# Patient Record
Sex: FEMALE | Race: WHITE | NOT HISPANIC OR LATINO | Employment: FULL TIME | URBAN - METROPOLITAN AREA
[De-identification: names, ages, dates, MRNs, and addresses within clinical notes are randomized per-mention and may not be internally consistent; named-entity substitution may affect disease eponyms.]

---

## 2021-05-06 ENCOUNTER — PATIENT MESSAGE (OUTPATIENT)
Dept: RESEARCH | Facility: HOSPITAL | Age: 51
End: 2021-05-06

## 2021-05-12 DIAGNOSIS — Z12.31 ENCOUNTER FOR SCREENING MAMMOGRAM FOR MALIGNANT NEOPLASM OF BREAST: Primary | ICD-10-CM

## 2021-06-27 ENCOUNTER — HOSPITAL ENCOUNTER (EMERGENCY)
Facility: HOSPITAL | Age: 51
Discharge: HOME | End: 2021-06-28
Attending: FAMILY MEDICINE
Payer: MEDICAID

## 2021-06-27 VITALS
HEART RATE: 95 BPM | RESPIRATION RATE: 18 BRPM | BODY MASS INDEX: 23.57 KG/M2 | TEMPERATURE: 98 F | SYSTOLIC BLOOD PRESSURE: 122 MMHG | HEIGHT: 63 IN | WEIGHT: 133 LBS | DIASTOLIC BLOOD PRESSURE: 57 MMHG | OXYGEN SATURATION: 98 %

## 2021-06-27 DIAGNOSIS — N30.90 CYSTITIS: Primary | ICD-10-CM

## 2021-06-27 LAB
B-HCG UR QL: NEGATIVE
BACTERIA #/AREA URNS HPF: ABNORMAL /HPF
BILIRUB UR QL STRIP: NEGATIVE
CLARITY UR: CLEAR
COLOR UR: YELLOW
GLUCOSE UR QL STRIP: NEGATIVE
HGB UR QL STRIP: ABNORMAL
HYALINE CASTS #/AREA URNS LPF: 0 /LPF
KETONES UR QL STRIP: NEGATIVE
LEUKOCYTE ESTERASE UR QL STRIP: ABNORMAL
MICROSCOPIC COMMENT: ABNORMAL
NITRITE UR QL STRIP: NEGATIVE
PH UR STRIP: 7 [PH] (ref 5–8)
PROT UR QL STRIP: NEGATIVE
RBC #/AREA URNS HPF: 3 /HPF (ref 0–4)
SP GR UR STRIP: <=1.005 (ref 1–1.03)
SQUAMOUS #/AREA URNS HPF: 0 /HPF
URN SPEC COLLECT METH UR: ABNORMAL
UROBILINOGEN UR STRIP-ACNC: NEGATIVE EU/DL
WBC #/AREA URNS HPF: 37 /HPF (ref 0–5)

## 2021-06-27 PROCEDURE — 81025 URINE PREGNANCY TEST: CPT | Performed by: FAMILY MEDICINE

## 2021-06-27 PROCEDURE — 25000003 PHARM REV CODE 250: Performed by: FAMILY MEDICINE

## 2021-06-27 PROCEDURE — 99284 EMERGENCY DEPT VISIT MOD MDM: CPT | Mod: 25

## 2021-06-27 PROCEDURE — 87088 URINE BACTERIA CULTURE: CPT | Performed by: FAMILY MEDICINE

## 2021-06-27 PROCEDURE — 87077 CULTURE AEROBIC IDENTIFY: CPT | Performed by: FAMILY MEDICINE

## 2021-06-27 PROCEDURE — 87186 SC STD MICRODIL/AGAR DIL: CPT | Performed by: FAMILY MEDICINE

## 2021-06-27 PROCEDURE — 81000 URINALYSIS NONAUTO W/SCOPE: CPT | Performed by: FAMILY MEDICINE

## 2021-06-27 PROCEDURE — 87086 URINE CULTURE/COLONY COUNT: CPT | Performed by: FAMILY MEDICINE

## 2021-06-27 RX ORDER — KETOROLAC TROMETHAMINE 10 MG/1
10 TABLET, FILM COATED ORAL
Status: COMPLETED | OUTPATIENT
Start: 2021-06-27 | End: 2021-06-27

## 2021-06-27 RX ORDER — OXYBUTYNIN CHLORIDE 5 MG/1
5 TABLET ORAL 3 TIMES DAILY
COMMUNITY
End: 2023-03-07 | Stop reason: ALTCHOICE

## 2021-06-27 RX ADMIN — KETOROLAC TROMETHAMINE 10 MG: 10 TABLET, FILM COATED ORAL at 10:06

## 2021-06-28 PROCEDURE — 25000003 PHARM REV CODE 250: Performed by: FAMILY MEDICINE

## 2021-06-28 RX ORDER — CEPHALEXIN 500 MG/1
500 CAPSULE ORAL 4 TIMES DAILY
Qty: 20 CAPSULE | Refills: 0 | Status: SHIPPED | OUTPATIENT
Start: 2021-06-28 | End: 2021-07-03

## 2021-06-28 RX ORDER — CEPHALEXIN 250 MG/1
500 CAPSULE ORAL
Status: COMPLETED | OUTPATIENT
Start: 2021-06-28 | End: 2021-06-28

## 2021-06-28 RX ADMIN — CEPHALEXIN 500 MG: 250 CAPSULE ORAL at 01:06

## 2021-06-30 LAB — BACTERIA UR CULT: ABNORMAL

## 2021-08-13 ENCOUNTER — HOSPITAL ENCOUNTER (OUTPATIENT)
Dept: RADIOLOGY | Facility: HOSPITAL | Age: 51
Discharge: HOME | End: 2021-08-13
Attending: INTERNAL MEDICINE
Payer: MEDICAID

## 2021-08-13 DIAGNOSIS — J18.9 PNEUMONIA: Primary | ICD-10-CM

## 2021-08-13 DIAGNOSIS — J18.9 PNEUMONIA: ICD-10-CM

## 2021-08-13 PROCEDURE — 71046 X-RAY EXAM CHEST 2 VIEWS: CPT | Mod: TC

## 2022-05-07 ENCOUNTER — HOSPITAL ENCOUNTER (EMERGENCY)
Facility: HOSPITAL | Age: 52
Discharge: HOME | End: 2022-05-07
Attending: STUDENT IN AN ORGANIZED HEALTH CARE EDUCATION/TRAINING PROGRAM
Payer: MEDICAID

## 2022-05-07 VITALS
HEART RATE: 88 BPM | WEIGHT: 130 LBS | RESPIRATION RATE: 16 BRPM | TEMPERATURE: 97 F | SYSTOLIC BLOOD PRESSURE: 121 MMHG | HEIGHT: 63 IN | BODY MASS INDEX: 23.04 KG/M2 | OXYGEN SATURATION: 100 % | DIASTOLIC BLOOD PRESSURE: 56 MMHG

## 2022-05-07 DIAGNOSIS — M79.18 MUSCULOSKELETAL PAIN: Primary | ICD-10-CM

## 2022-05-07 LAB
BACTERIA #/AREA URNS HPF: NEGATIVE /HPF
BILIRUB UR QL STRIP: NEGATIVE
CLARITY UR: CLEAR
COLOR UR: YELLOW
GLUCOSE UR QL STRIP: NEGATIVE
HGB UR QL STRIP: ABNORMAL
HYALINE CASTS #/AREA URNS LPF: 10.6 /LPF
KETONES UR QL STRIP: ABNORMAL
LEUKOCYTE ESTERASE UR QL STRIP: ABNORMAL
MICROSCOPIC COMMENT: ABNORMAL
NITRITE UR QL STRIP: NEGATIVE
PH UR STRIP: 7 [PH] (ref 5–8)
PROT UR QL STRIP: NEGATIVE
RBC #/AREA URNS HPF: 24 /HPF (ref 0–4)
SP GR UR STRIP: >=1.03 (ref 1–1.03)
SQUAMOUS #/AREA URNS HPF: 6 /HPF
URN SPEC COLLECT METH UR: ABNORMAL
UROBILINOGEN UR STRIP-ACNC: 1 EU/DL
WBC #/AREA URNS HPF: 5 /HPF (ref 0–5)

## 2022-05-07 PROCEDURE — 99284 EMERGENCY DEPT VISIT MOD MDM: CPT | Mod: 25

## 2022-05-07 PROCEDURE — 96372 THER/PROPH/DIAG INJ SC/IM: CPT | Performed by: STUDENT IN AN ORGANIZED HEALTH CARE EDUCATION/TRAINING PROGRAM

## 2022-05-07 PROCEDURE — 81000 URINALYSIS NONAUTO W/SCOPE: CPT | Performed by: STUDENT IN AN ORGANIZED HEALTH CARE EDUCATION/TRAINING PROGRAM

## 2022-05-07 PROCEDURE — 63600175 PHARM REV CODE 636 W HCPCS: Performed by: STUDENT IN AN ORGANIZED HEALTH CARE EDUCATION/TRAINING PROGRAM

## 2022-05-07 RX ORDER — CYCLOBENZAPRINE HCL 10 MG
10 TABLET ORAL
Status: DISCONTINUED | OUTPATIENT
Start: 2022-05-07 | End: 2022-05-07

## 2022-05-07 RX ORDER — CEPHALEXIN 500 MG/1
500 CAPSULE ORAL EVERY 12 HOURS
Qty: 14 CAPSULE | Refills: 0 | Status: SHIPPED | OUTPATIENT
Start: 2022-05-07 | End: 2022-05-14

## 2022-05-07 RX ORDER — CYCLOBENZAPRINE HCL 10 MG
10 TABLET ORAL 3 TIMES DAILY PRN
Qty: 15 TABLET | Refills: 0 | Status: SHIPPED | OUTPATIENT
Start: 2022-05-07 | End: 2022-05-12

## 2022-05-07 RX ORDER — IBUPROFEN 600 MG/1
600 TABLET ORAL EVERY 6 HOURS PRN
Qty: 20 TABLET | Refills: 0 | Status: SHIPPED | OUTPATIENT
Start: 2022-05-07 | End: 2022-11-04

## 2022-05-07 RX ORDER — KETOROLAC TROMETHAMINE 30 MG/ML
30 INJECTION, SOLUTION INTRAMUSCULAR; INTRAVENOUS
Status: COMPLETED | OUTPATIENT
Start: 2022-05-07 | End: 2022-05-07

## 2022-05-07 RX ADMIN — KETOROLAC TROMETHAMINE 30 MG: 30 INJECTION, SOLUTION INTRAMUSCULAR; INTRAVENOUS at 07:05

## 2022-05-07 NOTE — Clinical Note
"Caridad Sorianoa" Herminia was seen and treated in our emergency department on 5/7/2022.  She may return to work on 05/09/2022.       If you have any questions or concerns, please don't hesitate to call.      Brooks Villalba MD"

## 2022-05-07 NOTE — ED PROVIDER NOTES
Encounter Date: 5/7/2022       History     Chief Complaint   Patient presents with    Flank Pain     PT has been having left flank pain onset yesterday, recently started exercising again with my roller ball     51-year-old female presents with left-sided abdominal pain including upper lower and left flank pain for the past 2 days.  Patient said that she started using a rollerball for the 1st time the day before.  Has tried Tylenol ibuprofen with some relief.  Denies any vomiting, diarrhea, dysuria, hematuria, trauma, abdominal surgery        Review of patient's allergies indicates:   Allergen Reactions    Avelox [moxifloxacin]     Quinolones      Past Medical History:   Diagnosis Date    Hair loss     Herpes      Past Surgical History:   Procedure Laterality Date    TUBAL LIGATION       No family history on file.  Social History     Tobacco Use    Smoking status: Never Smoker   Substance Use Topics    Alcohol use: Never    Drug use: Never     Review of Systems   Constitutional: Negative.    HENT: Negative.    Respiratory: Negative.    Cardiovascular: Negative.    Gastrointestinal: Positive for abdominal pain.   Genitourinary: Positive for flank pain.   Skin: Negative.    Neurological: Negative.    Psychiatric/Behavioral: Negative.    All other systems reviewed and are negative.      Physical Exam     Initial Vitals   BP Pulse Resp Temp SpO2   05/07/22 0631 05/07/22 0631 05/07/22 0631 05/07/22 0631 05/07/22 0650   (!) 121/56 88 16 97.2 °F (36.2 °C) 100 %      MAP       --                Physical Exam    Nursing note and vitals reviewed.  Constitutional: Vital signs are normal. She appears well-developed and well-nourished.   HENT:   Head: Normocephalic and atraumatic.   Eyes: Conjunctivae and lids are normal.   Neck: Trachea normal. Neck supple.   Cardiovascular: Normal rate, regular rhythm, normal heart sounds, intact distal pulses and normal pulses.   No murmur heard.  Pulmonary/Chest: Breath sounds  normal. No respiratory distress.   Abdominal: Abdomen is soft. Bowel sounds are normal.   Musculoskeletal:      Cervical back: Neck supple.      Comments: Reproducible left upper and left lower pain to palpation and certain rotation     Neurological: She is alert and oriented to person, place, and time. She has normal strength. No cranial nerve deficit or sensory deficit.   Skin: Skin is warm. Capillary refill takes less than 2 seconds.   Psychiatric: She has a normal mood and affect. Her speech is normal. Thought content normal.         ED Course   Procedures  Labs Reviewed   URINALYSIS, REFLEX TO URINE CULTURE - Abnormal; Notable for the following components:       Result Value    Specific Gravity, UA >=1.030 (*)     Ketones, UA Trace (*)     Occult Blood UA Trace (*)     Leukocytes, UA Trace (*)     All other components within normal limits    Narrative:     Preferred Collection Type->Urine, Clean Catch  Specimen Source->Urine   URINALYSIS MICROSCOPIC - Abnormal; Notable for the following components:    RBC, UA 24 (*)     Hyaline Casts, UA 10.6 (*)     All other components within normal limits    Narrative:     Preferred Collection Type->Urine, Clean Catch  Specimen Source->Urine          Imaging Results    None          Medications   ketorolac injection 30 mg (30 mg Intramuscular Given 5/7/22 0707)     Medical Decision Making:   Initial Assessment:   51-year-old female presents with left-sided abdominal pain including upper lower and left flank pain for the past 2 days.  Afebrile vitals stable.  Physical noted.  Screening urine.  Treat pain.  Re-evaluate             ED Course as of 05/07/22 0803   Sat May 07, 2022   0801 Patient feels better after medication.  Hematuria but patient says that she has been having hematuria for a while now and is currently being scheduled to see a urologist.  Will discharge patient home with a few days of antibiotics.  Anti-inflammatory muscle relaxer for pain.  Strict return  precautions [HD]      ED Course User Index  [HD] Brooks Villalba MD             Clinical Impression:   Final diagnoses:  [M79.18] Musculoskeletal pain (Primary)          ED Disposition Condition    Discharge Stable        ED Prescriptions     Medication Sig Dispense Start Date End Date Auth. Provider    cephALEXin (KEFLEX) 500 MG capsule Take 1 capsule (500 mg total) by mouth every 12 (twelve) hours. for 7 days 14 capsule 5/7/2022 5/14/2022 Brooks Villalba MD    cyclobenzaprine (FLEXERIL) 10 MG tablet Take 1 tablet (10 mg total) by mouth 3 (three) times daily as needed for Muscle spasms. 15 tablet 5/7/2022 5/12/2022 Brooks Villalba MD    ibuprofen (ADVIL,MOTRIN) 600 MG tablet Take 1 tablet (600 mg total) by mouth every 6 (six) hours as needed for Pain. 20 tablet 5/7/2022  Brooks Villalba MD        Follow-up Information     Follow up With Specialties Details Why Contact Info    Maya Klein MD Internal Medicine  As needed, If symptoms worsen [address]  [address]  [facility] [address]  [phone]             Brooks Villalba MD  05/07/22 0803

## 2022-09-21 ENCOUNTER — HOSPITAL ENCOUNTER (OUTPATIENT)
Dept: SLEEP MEDICINE | Facility: HOSPITAL | Age: 52
Discharge: HOME | End: 2022-09-21
Payer: MEDICAID

## 2022-09-21 DIAGNOSIS — G47.30 SLEEP APNEA: ICD-10-CM

## 2022-09-21 DIAGNOSIS — G47.30 SLEEP APNEA: Primary | ICD-10-CM

## 2022-09-21 PROCEDURE — 95806 SLEEP STUDY UNATT&RESP EFFT: CPT

## 2022-10-24 ENCOUNTER — OFFICE VISIT (OUTPATIENT)
Dept: SURGERY | Facility: CLINIC | Age: 52
End: 2022-10-24
Payer: MEDICAID

## 2022-10-24 VITALS
DIASTOLIC BLOOD PRESSURE: 57 MMHG | BODY MASS INDEX: 23.17 KG/M2 | OXYGEN SATURATION: 98 % | HEIGHT: 63 IN | HEART RATE: 86 BPM | SYSTOLIC BLOOD PRESSURE: 102 MMHG | WEIGHT: 130.75 LBS

## 2022-10-24 DIAGNOSIS — Z12.11 SCREEN FOR COLON CANCER: Primary | ICD-10-CM

## 2022-10-24 DIAGNOSIS — Z01.818 PRE-OP TESTING: ICD-10-CM

## 2022-10-24 PROCEDURE — 3074F SYST BP LT 130 MM HG: CPT | Mod: CPTII,,, | Performed by: STUDENT IN AN ORGANIZED HEALTH CARE EDUCATION/TRAINING PROGRAM

## 2022-10-24 PROCEDURE — 1159F PR MEDICATION LIST DOCUMENTED IN MEDICAL RECORD: ICD-10-PCS | Mod: CPTII,,, | Performed by: STUDENT IN AN ORGANIZED HEALTH CARE EDUCATION/TRAINING PROGRAM

## 2022-10-24 PROCEDURE — 3008F BODY MASS INDEX DOCD: CPT | Mod: CPTII,,, | Performed by: STUDENT IN AN ORGANIZED HEALTH CARE EDUCATION/TRAINING PROGRAM

## 2022-10-24 PROCEDURE — 99204 PR OFFICE/OUTPT VISIT, NEW, LEVL IV, 45-59 MIN: ICD-10-PCS | Mod: S$PBB,,, | Performed by: STUDENT IN AN ORGANIZED HEALTH CARE EDUCATION/TRAINING PROGRAM

## 2022-10-24 PROCEDURE — 99215 OFFICE O/P EST HI 40 MIN: CPT | Mod: PBBFAC | Performed by: STUDENT IN AN ORGANIZED HEALTH CARE EDUCATION/TRAINING PROGRAM

## 2022-10-24 PROCEDURE — 3078F PR MOST RECENT DIASTOLIC BLOOD PRESSURE < 80 MM HG: ICD-10-PCS | Mod: CPTII,,, | Performed by: STUDENT IN AN ORGANIZED HEALTH CARE EDUCATION/TRAINING PROGRAM

## 2022-10-24 PROCEDURE — 3078F DIAST BP <80 MM HG: CPT | Mod: CPTII,,, | Performed by: STUDENT IN AN ORGANIZED HEALTH CARE EDUCATION/TRAINING PROGRAM

## 2022-10-24 PROCEDURE — 3008F PR BODY MASS INDEX (BMI) DOCUMENTED: ICD-10-PCS | Mod: CPTII,,, | Performed by: STUDENT IN AN ORGANIZED HEALTH CARE EDUCATION/TRAINING PROGRAM

## 2022-10-24 PROCEDURE — 99999 PR PBB SHADOW E&M-EST. PATIENT-LVL V: CPT | Mod: PBBFAC,,, | Performed by: STUDENT IN AN ORGANIZED HEALTH CARE EDUCATION/TRAINING PROGRAM

## 2022-10-24 PROCEDURE — 99999 PR PBB SHADOW E&M-EST. PATIENT-LVL V: ICD-10-PCS | Mod: PBBFAC,,, | Performed by: STUDENT IN AN ORGANIZED HEALTH CARE EDUCATION/TRAINING PROGRAM

## 2022-10-24 PROCEDURE — 3074F PR MOST RECENT SYSTOLIC BLOOD PRESSURE < 130 MM HG: ICD-10-PCS | Mod: CPTII,,, | Performed by: STUDENT IN AN ORGANIZED HEALTH CARE EDUCATION/TRAINING PROGRAM

## 2022-10-24 PROCEDURE — 1159F MED LIST DOCD IN RCRD: CPT | Mod: CPTII,,, | Performed by: STUDENT IN AN ORGANIZED HEALTH CARE EDUCATION/TRAINING PROGRAM

## 2022-10-24 PROCEDURE — 99204 OFFICE O/P NEW MOD 45 MIN: CPT | Mod: S$PBB,,, | Performed by: STUDENT IN AN ORGANIZED HEALTH CARE EDUCATION/TRAINING PROGRAM

## 2022-10-24 NOTE — PATIENT INSTRUCTIONS
Increase water intake to 2L per day (4 bottles)  This can be done gradually over time    Increase fiber intake to 21-25 grams of of fiber a day  You may find the supplement of your choice at your local pharmacy;  Follow the directions indicated on the package  Fiber must be taken with adequate amounts of water to avoid bloating and abdominal discomfort  If drinking enough water is difficult for you, increase your water intake before beginning a fiber supplement    Take 30ml (shot glass) of Mineral oil 1-2 times a day for constipation  Mineral oil will help make the hard stool easier to pass     You may take Miralax 1-3 times a day  This is also to be taken with adequate amounts of water  You may add Miralax back to your regimen if increasing water and fiber intake is unsuccessful in improving constipation

## 2022-10-25 RX ORDER — SOD SULF/POT CHLORIDE/MAG SULF 1.479 G
12 TABLET ORAL 2 TIMES DAILY
Qty: 24 TABLET | Refills: 0 | Status: ON HOLD | OUTPATIENT
Start: 2022-10-25 | End: 2023-01-25 | Stop reason: HOSPADM

## 2022-10-25 RX ORDER — SODIUM CHLORIDE 9 MG/ML
INJECTION, SOLUTION INTRAVENOUS CONTINUOUS
Status: CANCELLED | OUTPATIENT
Start: 2022-10-25

## 2022-10-25 RX ORDER — SODIUM CHLORIDE 0.9 % (FLUSH) 0.9 %
10 SYRINGE (ML) INJECTION
Status: CANCELLED | OUTPATIENT
Start: 2022-10-25

## 2022-11-03 ENCOUNTER — LAB VISIT (OUTPATIENT)
Dept: LAB | Facility: HOSPITAL | Age: 52
End: 2022-11-03
Payer: MEDICAID

## 2022-11-03 DIAGNOSIS — K62.5 HEMORRHAGE OF RECTUM AND ANUS: Primary | ICD-10-CM

## 2022-11-03 LAB
BASOPHILS # BLD AUTO: 0.05 K/UL (ref 0–0.2)
BASOPHILS NFR BLD: 0.6 % (ref 0–1.9)
DIFFERENTIAL METHOD: ABNORMAL
EOSINOPHIL # BLD AUTO: 0.1 K/UL (ref 0–0.5)
EOSINOPHIL NFR BLD: 1.2 % (ref 0–8)
ERYTHROCYTE [DISTWIDTH] IN BLOOD BY AUTOMATED COUNT: 12 % (ref 11.5–14.5)
HCT VFR BLD AUTO: 36.7 % (ref 37–48.5)
HGB BLD-MCNC: 12.4 G/DL (ref 12–16)
IMM GRANULOCYTES # BLD AUTO: 0.02 K/UL (ref 0–0.04)
IMM GRANULOCYTES NFR BLD AUTO: 0.2 % (ref 0–0.5)
LYMPHOCYTES # BLD AUTO: 2.2 K/UL (ref 1–4.8)
LYMPHOCYTES NFR BLD: 26 % (ref 18–48)
MCH RBC QN AUTO: 29.9 PG (ref 27–31)
MCHC RBC AUTO-ENTMCNC: 33.8 G/DL (ref 32–36)
MCV RBC AUTO: 88 FL (ref 82–98)
MONOCYTES # BLD AUTO: 0.8 K/UL (ref 0.3–1)
MONOCYTES NFR BLD: 9 % (ref 4–15)
NEUTROPHILS # BLD AUTO: 5.4 K/UL (ref 1.8–7.7)
NEUTROPHILS NFR BLD: 63 % (ref 38–73)
NRBC BLD-RTO: 0 /100 WBC
PLATELET # BLD AUTO: 301 K/UL (ref 150–450)
PMV BLD AUTO: 9.6 FL (ref 9.2–12.9)
RBC # BLD AUTO: 4.15 M/UL (ref 4–5.4)
WBC # BLD AUTO: 8.62 K/UL (ref 3.9–12.7)

## 2022-11-03 PROCEDURE — 36415 COLL VENOUS BLD VENIPUNCTURE: CPT

## 2022-11-03 PROCEDURE — 85025 COMPLETE CBC W/AUTO DIFF WBC: CPT

## 2022-11-04 NOTE — H&P
"[facility] H&P      Consult:  Screening colonoscopy   Consulting Service: Dr. Klein    Chief Complaint: None    HPI: Pt is a 52 y.o. female with no sig PMH who presents for routine screening colonoscopy.   No personal or family history of colon cancer.  Has daily regular bowel movements with the aid of stool softeners.  Notices increased constipation after starting herbal energy supplement.  History of internal hemorrhoids with some bleeding when wiping.   Denies melena, rectal bleeding or pain, change in bowel habits or unintended weight loss.  Denies CP, SOB, abdominal pain, nausea, vomiting, fevers, or chills.        PMH:   Past Medical History:   Diagnosis Date    Hair loss     Herpes      PSH:   Past Surgical History:   Procedure Laterality Date    TUBAL LIGATION       Meds: See medication list;  No anticoagulation   ALL: Avelox [moxifloxacin] and Quinolones  FHX: non contributory   SOC: Denies KAYLEY  ROS: Review of Systems   Constitutional:  Negative for chills, fever, malaise/fatigue and weight loss.   Respiratory:  Negative for cough.    Cardiovascular:  Negative for chest pain.   Gastrointestinal:  Negative for abdominal pain, blood in stool, constipation, diarrhea, heartburn, melena, nausea and vomiting.   All other systems reviewed and are negative.    Physical Exam:  BP (!) 102/57 (BP Location: Left arm, Patient Position: Sitting, BP Method: Medium (Automatic))   Pulse 86   Ht 5' 3" (1.6 m)   Wt 59.3 kg (130 lb 11.7 oz)   SpO2 98%   BMI 23.16 kg/m²   Physical Exam  Vitals reviewed.   Constitutional:       General: She is not in acute distress.     Appearance: She is normal weight. She is not ill-appearing or toxic-appearing.   Cardiovascular:      Rate and Rhythm: Normal rate and regular rhythm.   Pulmonary:      Effort: Pulmonary effort is normal. No respiratory distress.   Abdominal:      General: There is no distension.      Palpations: Abdomen is soft.      " Tenderness: There is no abdominal tenderness. There is no guarding or rebound.   Musculoskeletal:      Right lower leg: No edema.      Left lower leg: No edema.   Skin:     General: Skin is warm and dry.      Capillary Refill: Capillary refill takes less than 2 seconds.   Neurological:      Mental Status: She is alert. Mental status is at baseline.         A/P: Pt is a 52 y.o. female who presents for routine screening colonoscopy  --To [facility] on 12/7 for colonoscopy  --Procedure in detail explained to patient;  including risks including but not limited to bleeding, infection, damage to surrounding structures, and perforation- patient voiced understanding  --CBC, CMP, CXR, EKG, COVID   --Bowel prep given - Sutab  --CLD day before procedure            Yarelis Paez MD  [phone]

## 2022-11-28 NOTE — DISCHARGE INSTRUCTIONS
BEFORE THE PROCEDURE:    REPORT ANY CHANGE IN YOUR PHYSICAL CONDITION TO YOUR DOCTOR IMMEDIATELY.  SELF ISOLATE AND CHECK TEMPERATURE DAILY, IF TEMP OVER 100, CALL PHYSICIAN IMMEDIATELY.    TRY TO REFRAIN FROM SMOKING AND ALCOHOL 72 HOURS BEFORE YOUR PROCEDURE.     THE DAY BEFORE YOUR PROCEDURE YOU WILL BE ON A  LIQUID DIET FROM WAKING IN THE MORNING UNTIL MIDNIGHT.    REFER TO YOUR PHYSICIANS INSTRUCTIONS ON LIQUID DIET AND COLON PREP.    NO MAKE UP, NAIL POLISH OR JEWELRY THE DAY OF PROCEDURE.      SOMEONE WILL CALL YOU THE DAY BEFORE YOUR PROCEDURE WITH A CHECK-IN TIME FOR YOUR PROCEDURE.    CHECK IN AT FIRST FLOOR REGISTRATION DESK.     DAY OF YOUR PROCEDURE:    TAKE BLOOD PRESSURE MEDICATIONS THE MORNING OF YOUR PROCEDURE, WITH SMALL SIPS WATER, AS DIRECTED BY YOUR PHYSICIAN.   DO NOT TAKE ANY DIABETIC MEDICATIONS UNLESS DIRECTED TO DO SO BY YOUR PHYSICIAN.   CONTACT LENSES AND DENTURES MUST BE REMOVED.  A RESPONSIBLE ADULT MUST ACCOMPANY YOU HOME UPON DISCHARGE.   ONLY 1 VISITOR ALLOWED PER ROOM.     YOUR THOUGHTS AND OPINIONS HELP US TO BETTER SERVE YOU.     PLEASE PARTICIPATE IN SURVEYS ABOUT YOUR CARE.    THANK YOU FOR CHOOSING [facility].

## 2022-11-29 ENCOUNTER — HOSPITAL ENCOUNTER (OUTPATIENT)
Dept: PREADMISSION TESTING | Facility: HOSPITAL | Age: 52
Discharge: HOME | End: 2022-11-29
Attending: STUDENT IN AN ORGANIZED HEALTH CARE EDUCATION/TRAINING PROGRAM
Payer: MEDICAID

## 2022-12-16 DIAGNOSIS — M54.12 CERVICAL RADICULOPATHY: Primary | ICD-10-CM

## 2023-01-18 ENCOUNTER — HOSPITAL ENCOUNTER (OUTPATIENT)
Dept: PREADMISSION TESTING | Facility: HOSPITAL | Age: 53
Discharge: HOME | End: 2023-01-18
Attending: STUDENT IN AN ORGANIZED HEALTH CARE EDUCATION/TRAINING PROGRAM
Payer: MEDICAID

## 2023-01-18 ENCOUNTER — HOSPITAL ENCOUNTER (OUTPATIENT)
Dept: PULMONOLOGY | Facility: HOSPITAL | Age: 53
Discharge: HOME | End: 2023-01-18
Attending: STUDENT IN AN ORGANIZED HEALTH CARE EDUCATION/TRAINING PROGRAM
Payer: MEDICAID

## 2023-01-18 VITALS — HEIGHT: 63 IN | WEIGHT: 130 LBS | BODY MASS INDEX: 23.04 KG/M2

## 2023-01-18 DIAGNOSIS — Z12.11 SCREEN FOR COLON CANCER: ICD-10-CM

## 2023-01-18 DIAGNOSIS — Z01.818 PRE-OP TESTING: ICD-10-CM

## 2023-01-18 PROCEDURE — 93005 ELECTROCARDIOGRAM TRACING: CPT

## 2023-01-18 PROCEDURE — 93010 EKG 12-LEAD: ICD-10-PCS | Mod: ,,, | Performed by: INTERNAL MEDICINE

## 2023-01-18 PROCEDURE — 93010 ELECTROCARDIOGRAM REPORT: CPT | Mod: ,,, | Performed by: INTERNAL MEDICINE

## 2023-01-24 ENCOUNTER — ANESTHESIA EVENT (OUTPATIENT)
Dept: ENDOSCOPY | Facility: HOSPITAL | Age: 53
End: 2023-01-24
Payer: MEDICAID

## 2023-01-24 ENCOUNTER — NURSE TRIAGE (OUTPATIENT)
Dept: ADMINISTRATIVE | Facility: CLINIC | Age: 53
End: 2023-01-24
Payer: MEDICAID

## 2023-01-24 NOTE — ANESTHESIA PREPROCEDURE EVALUATION
01/24/2023  Caridad Hope is a 52 y.o., female.      Pre-op Assessment    I have reviewed the Patient Summary Reports.    I have reviewed the NPO Status.   I have reviewed the Medications.     Review of Systems  Anesthesia Hx:  No problems with previous Anesthesia  Denies Family Hx of Anesthesia complications.   Denies Personal Hx of Anesthesia complications.   Social:  Non-Smoker    Cardiovascular:  Cardiovascular Normal  ECG has been reviewed.    Pulmonary:  Pulmonary Normal    Renal/:  Renal/ Normal     Hepatic/GI:  Hepatic/GI Normal    Neurological:  Neurology Normal    Endocrine:  Endocrine Normal      Lab Results   Component Value Date    WBC 6.47 01/18/2023    HGB 11.7 (L) 01/18/2023    HCT 35.5 (L) 01/18/2023    MCV 87 01/18/2023     01/18/2023     CMP  Sodium   Date Value Ref Range Status   01/18/2023 139 136 - 145 mmol/L Final     Potassium   Date Value Ref Range Status   01/18/2023 4.1 3.5 - 5.1 mmol/L Final     Chloride   Date Value Ref Range Status   01/18/2023 105 95 - 110 mmol/L Final     CO2   Date Value Ref Range Status   01/18/2023 30 (H) 23 - 29 mmol/L Final     Glucose   Date Value Ref Range Status   01/18/2023 93 70 - 110 mg/dL Final     BUN   Date Value Ref Range Status   01/18/2023 18 6 - 20 mg/dL Final     Creatinine   Date Value Ref Range Status   01/18/2023 0.8 0.5 - 1.4 mg/dL Final     Calcium   Date Value Ref Range Status   01/18/2023 9.0 8.7 - 10.5 mg/dL Final     Total Protein   Date Value Ref Range Status   01/18/2023 7.3 6.0 - 8.4 g/dL Final     Albumin   Date Value Ref Range Status   01/18/2023 3.7 3.5 - 5.2 g/dL Final     Total Bilirubin   Date Value Ref Range Status   01/18/2023 0.3 0.1 - 1.0 mg/dL Final     Comment:     For infants and newborns, interpretation of results should be based  on gestational age, weight and in agreement with  clinical  observations.    Premature Infant recommended reference ranges:  Up to 24 hours.............<8.0 mg/dL  Up to 48 hours............<12.0 mg/dL  3-5 days..................<15.0 mg/dL  6-29 days.................<15.0 mg/dL    For patients on Eltrombopag therapy, use of Dimension Thorp TBIL is   not   recommended.       Alkaline Phosphatase   Date Value Ref Range Status   01/18/2023 93 55 - 135 U/L Final     AST   Date Value Ref Range Status   01/18/2023 14 10 - 40 U/L Final     ALT   Date Value Ref Range Status   01/18/2023 18 10 - 44 U/L Final     Anion Gap   Date Value Ref Range Status   01/18/2023 4 (L) 8 - 16 mmol/L Final     eGFR   Date Value Ref Range Status   01/18/2023 >60.0 >60 mL/min/1.73 m^2 Final       Physical Exam  General: Well nourished    Airway:  Mallampati: II / I  Mouth Opening: Normal  TM Distance: Normal  Tongue: Normal  Neck ROM: Normal ROM    Dental:  Intact    Chest/Lungs:  Clear to auscultation    Heart:  Rate: Normal  Rhythm: Regular Rhythm  Sounds: Normal        Anesthesia Plan  Type of Anesthesia, risks & benefits discussed:    Anesthesia Type: MAC  Intra-op Monitoring Plan: Standard ASA Monitors  Post Op Pain Control Plan: multimodal analgesia  Induction:  IV  Airway Plan: Direct  Informed Consent: Informed consent signed with the Patient and all parties understand the risks and agree with anesthesia plan.  All questions answered.   ASA Score: 1  Day of Surgery Review of History & Physical: I have interviewed and examined the patient. I have reviewed the patient's H&P dated: There are no significant changes.     Ready For Surgery From Anesthesia Perspective.     .

## 2023-01-25 ENCOUNTER — ANESTHESIA (OUTPATIENT)
Dept: ENDOSCOPY | Facility: HOSPITAL | Age: 53
End: 2023-01-25
Payer: MEDICAID

## 2023-01-25 ENCOUNTER — HOSPITAL ENCOUNTER (OUTPATIENT)
Facility: HOSPITAL | Age: 53
Discharge: HOME | End: 2023-01-25
Attending: STUDENT IN AN ORGANIZED HEALTH CARE EDUCATION/TRAINING PROGRAM | Admitting: STUDENT IN AN ORGANIZED HEALTH CARE EDUCATION/TRAINING PROGRAM
Payer: MEDICAID

## 2023-01-25 VITALS
HEART RATE: 76 BPM | TEMPERATURE: 98 F | DIASTOLIC BLOOD PRESSURE: 55 MMHG | SYSTOLIC BLOOD PRESSURE: 92 MMHG | OXYGEN SATURATION: 99 % | RESPIRATION RATE: 18 BRPM

## 2023-01-25 DIAGNOSIS — Z12.11 SCREEN FOR COLON CANCER: Primary | ICD-10-CM

## 2023-01-25 LAB — B-HCG UR QL: NEGATIVE

## 2023-01-25 PROCEDURE — G0121 COLON CA SCRN NOT HI RSK IND: HCPCS | Mod: 52,,, | Performed by: STUDENT IN AN ORGANIZED HEALTH CARE EDUCATION/TRAINING PROGRAM

## 2023-01-25 PROCEDURE — 37000008 HC ANESTHESIA 1ST 15 MINUTES: Performed by: STUDENT IN AN ORGANIZED HEALTH CARE EDUCATION/TRAINING PROGRAM

## 2023-01-25 PROCEDURE — 81025 URINE PREGNANCY TEST: CPT | Performed by: STUDENT IN AN ORGANIZED HEALTH CARE EDUCATION/TRAINING PROGRAM

## 2023-01-25 PROCEDURE — 25000003 PHARM REV CODE 250: Performed by: STUDENT IN AN ORGANIZED HEALTH CARE EDUCATION/TRAINING PROGRAM

## 2023-01-25 PROCEDURE — 00812 ANES LWR INTST SCR COLSC: CPT | Performed by: STUDENT IN AN ORGANIZED HEALTH CARE EDUCATION/TRAINING PROGRAM

## 2023-01-25 PROCEDURE — G0121 COLON CA SCRN NOT HI RSK IND: ICD-10-PCS | Mod: 52,,, | Performed by: STUDENT IN AN ORGANIZED HEALTH CARE EDUCATION/TRAINING PROGRAM

## 2023-01-25 PROCEDURE — G0121 COLON CA SCRN NOT HI RSK IND: HCPCS | Mod: 74 | Performed by: STUDENT IN AN ORGANIZED HEALTH CARE EDUCATION/TRAINING PROGRAM

## 2023-01-25 PROCEDURE — 37000009 HC ANESTHESIA EA ADD 15 MINS: Performed by: STUDENT IN AN ORGANIZED HEALTH CARE EDUCATION/TRAINING PROGRAM

## 2023-01-25 RX ORDER — PROPOFOL 10 MG/ML
VIAL (ML) INTRAVENOUS
Status: DISCONTINUED | OUTPATIENT
Start: 2023-01-25 | End: 2023-01-25

## 2023-01-25 RX ORDER — SODIUM CHLORIDE 0.9 % (FLUSH) 0.9 %
10 SYRINGE (ML) INJECTION
Status: DISCONTINUED | OUTPATIENT
Start: 2023-01-25 | End: 2023-01-25 | Stop reason: HOSPADM

## 2023-01-25 RX ORDER — SODIUM CHLORIDE 9 MG/ML
INJECTION, SOLUTION INTRAVENOUS CONTINUOUS
Status: DISCONTINUED | OUTPATIENT
Start: 2023-01-25 | End: 2023-01-25 | Stop reason: HOSPADM

## 2023-01-25 RX ADMIN — Medication 50 MG: at 08:01

## 2023-01-25 RX ADMIN — Medication 100 MG: at 08:01

## 2023-01-25 RX ADMIN — SODIUM CHLORIDE: 9 INJECTION, SOLUTION INTRAVENOUS at 06:01

## 2023-01-25 NOTE — PLAN OF CARE
RECEIVED PT TO ROOM [id number] ACCOMPANIED BY ADAMRN, PT AA&OX3, NO C/O. SNACK AVAILABLE, CALLBELL IN REACH. CALL WITH NEEDS.

## 2023-01-25 NOTE — DISCHARGE INSTRUCTIONS
FOLLOW UP WITH DR MOREIRA AS SCHEDULED.    NO DRIVING OR DRINKING ALCOHOL FOR 24 HOURS.    CALL DR MOREIRA'S OFFICE FOR ANY QUESTIONS OR CONCERNS.  REPORT TO THE ER IF URGENT.    THANK YOU FOR CHOOSING [facility]!

## 2023-01-25 NOTE — TELEPHONE ENCOUNTER
Reason for Disposition   Abdominal bloating, cramping, nausea, or vomiting while drinking bowel prep, questions about    Additional Information   Negative: Shock suspected (e.g., cold/pale/clammy skin, too weak to stand, low BP, rapid pulse)   Negative: Difficult to awaken or acting confused (e.g., disoriented, slurred speech)   Negative: Passed out (i.e., lost consciousness, collapsed and was not responding)   Negative: Sounds like a life-threatening emergency to the triager    Protocols used: Colonoscopy Symptoms and [id number]

## 2023-01-25 NOTE — TRANSFER OF CARE
Anesthesia Transfer of Care Note    Patient: Caridad Hope    Procedure(s) Performed: Procedure(s) (LRB):  COLONOSCOPY (N/A)    Patient location: GI    Anesthesia Type: MAC    Transport from OR: Transported from OR on room air with adequate spontaneous ventilation    Post pain: adequate analgesia    Post assessment: no apparent anesthetic complications    Post vital signs: stable    Level of consciousness: awake    Nausea/Vomiting: no nausea/vomiting    Complications: none    Transfer of care protocol was followed      Last vitals:   97/53  16 RR  82 HR  99% O2 SAT

## 2023-01-25 NOTE — H&P
[facility] H&P      Consult:  Screening colonoscopy   Consulting Service: Dr. Klein    Chief Complaint: None    HPI: Pt is a 52 y.o. female with no sig PMH who presents for routine screening colonoscopy.   No personal or family history of colon cancer.  Has daily regular bowel movements with the aid of stool softeners.  Notices increased constipation after starting herbal energy supplement.  History of internal hemorrhoids with some bleeding when wiping.   Denies melena, rectal bleeding or pain, change in bowel habits or unintended weight loss.  Denies CP, SOB, abdominal pain, nausea, vomiting, fevers, or chills.        PMH:   Past Medical History:   Diagnosis Date    Blood in stool 01/2023    Hair loss     Herpes     History of chest pain     Wears contact lenses     LEFT     PSH:   Past Surgical History:   Procedure Laterality Date    TUBAL LIGATION       Meds: See medication list;  No anticoagulation   ALL: Avelox [moxifloxacin] and Quinolones  FHX: non contributory   SOC: Denies KAYLEY  ROS: Review of Systems   Constitutional:  Negative for chills, fever, malaise/fatigue and weight loss.   Respiratory:  Negative for cough.    Cardiovascular:  Negative for chest pain.   Gastrointestinal:  Negative for abdominal pain, blood in stool, constipation, diarrhea, heartburn, melena, nausea and vomiting.   Psychiatric/Behavioral:  Substance abuse: .kmcendo.    All other systems reviewed and are negative.    Physical Exam:  BP (!) 97/58 (BP Location: Right arm)   Pulse 78   Temp 97.9 °F (36.6 °C) (Oral)   Resp 20   LMP 01/11/2023 (Approximate)   SpO2 98%   Breastfeeding No   Physical Exam  Vitals reviewed.   Constitutional:       General: She is not in acute distress.     Appearance: She is normal weight. She is not ill-appearing or toxic-appearing.   Cardiovascular:      Rate and Rhythm: Normal rate and regular rhythm.   Pulmonary:      Effort: Pulmonary effort is normal. No respiratory  distress.   Abdominal:      General: There is no distension.      Palpations: Abdomen is soft.      Tenderness: There is no abdominal tenderness. There is no guarding or rebound.   Musculoskeletal:      Right lower leg: No edema.      Left lower leg: No edema.   Skin:     General: Skin is warm and dry.      Capillary Refill: Capillary refill takes less than 2 seconds.   Neurological:      Mental Status: She is alert. Mental status is at baseline.         A/P: Pt is a 52 y.o. female who presents for routine screening colonoscopy  Patient seen and examined.  No interval change since the below obtained H&P  Informed consent verified    NPO since midnight  Prep poorly tolerated; last BM liquid/brown  All questions and concerns addressed  To Endo for screening colonoscopy    Yarelis Paez MD  General Surgery   [phone]                Yarelis Paze MD  [phone]

## 2023-01-25 NOTE — DISCHARGE SUMMARY
[facility] - Endoscopy  Discharge Note  Short Stay    Procedure(s) (LRB):  Flex Sig (N/A)      OUTCOME: Patient tolerated treatment/procedure well without complication and is now ready for discharge.    DISPOSITION: Home or Self Care    FINAL DIAGNOSIS:  Screen for colon cancer    FOLLOWUP: In clinic    DISCHARGE INSTRUCTIONS:    Discharge Procedure Orders   Diet Adult Regular     No driving until:     Notify your health care provider if you experience any of the following:  temperature >100.4     Notify your health care provider if you experience any of the following:  persistent nausea and vomiting or diarrhea     Notify your health care provider if you experience any of the following:  severe uncontrolled pain     Activity as tolerated        TIME SPENT ON DISCHARGE: 5 minutes

## 2023-01-25 NOTE — OP NOTE
[facility] - OR Periop Services  General Surgery Department  Operative Note    SUMMARY     Date of Procedure: 1/25/2023    Procedure: Procedure(s) (LRB):  Flex Sig:      Surgeon(s) and Role:  Yarelis Paez MD     Pre-Operative Diagnosis: Pre-Op Diagnosis Codes:     * Screen for colon cancer [Z12.11]    Post-Operative Diagnosis: Same         Anesthesia: Local MAC      Findings:  Poor prep;  unable to advance past sigmoid colon  Procedure terminated   Grade 1 internal hemorrhoids     Indications for the Procedure:  53yo female with no FH of CRC who presents for screening colonoscopy    Operative Conduct in Detail:   The risks, benefits, and alternatives were thoroughly discussed with the patient. Despite the risks, the patient wished to proceed. Informed consent was obtained and it will scanned to the electronic chart.      The patient was placed on left lateral decubitus. After achieving moderate sedation, a digital rectal examination was performed; subsequently, a standard colonoscope was introduced through the anus and advanced with difficulty up to the sigmoid. The scope was withdrawn slowly while the mucosa was carefully examined. The following findings were seen:    Poor prep;  unable to advance past sigmoid colon  Procedure terminated   Grade 1 internal hemorrhoids         Complications: No    Estimated Blood Loss (EBL): None             Specimens:   None           Condition: Good    Disposition: PACU - hemodynamically stable.    Recommendation:  RTC in 3 months to schedule repeat colonoscopy    Yarelis Paez MD  General Surgery   [phone]

## 2023-01-25 NOTE — TELEPHONE ENCOUNTER
Patient is still passing brown liquid stool at this time. SHe is scheduled to arrive for 6a. She will arrive and be evaluated on site as to wether she will be able to have the procedure.

## 2023-01-25 NOTE — ANESTHESIA POSTPROCEDURE EVALUATION
Anesthesia Post Evaluation    Patient: Caridad Hope    Procedure(s) Performed: Procedure(s) (LRB):  Flex Sig (N/A)    Final Anesthesia Type: MAC      Patient location during evaluation: OPS  Patient participation: Yes- Able to Participate  Level of consciousness: awake  Post-procedure vital signs: reviewed and stable  Pain management: adequate  Airway patency: patent    PONV status at discharge: No PONV  Anesthetic complications: no      Cardiovascular status: blood pressure returned to baseline  Respiratory status: spontaneous ventilation  Hydration status: euvolemic  Follow-up not needed.          Vitals Value Taken Time   BP 92/55 01/25/23 0906   Temp 36.4 °C (97.5 °F) 01/25/23 0906   Pulse 76 01/25/23 0906   Resp 18 01/25/23 0906   SpO2 99 % 01/25/23 0906         No case tracking events are documented in the log.      Pain/Anthony Score: Anthony Score: 10 (1/25/2023  9:06 AM)

## 2023-02-06 ENCOUNTER — CLINICAL SUPPORT (OUTPATIENT)
Dept: REHABILITATION | Facility: HOSPITAL | Age: 53
End: 2023-02-06
Payer: MEDICAID

## 2023-02-06 DIAGNOSIS — M54.12 CERVICAL RADICULOPATHY: Primary | ICD-10-CM

## 2023-02-06 PROCEDURE — 97161 PT EVAL LOW COMPLEX 20 MIN: CPT

## 2023-02-06 NOTE — PLAN OF CARE
Physical Therapy Initial Evaluation     Name: Caridad Burton [facility] Number: [id number]    Diagnosis:   Encounter Diagnosis   Name Primary?    Cervical radiculopathy Yes     Physician: Antwan Healy PA  Treatment Orders: PT Eval and Treat  Past Medical History:   Diagnosis Date    Blood in stool 01/2023    Hair loss     Herpes     History of chest pain     Wears contact lenses     LEFT     Current Outpatient Medications   Medication Sig    finasteride (PROSCAR) 5 mg tablet Take 5 mg by mouth once daily.    oxybutynin (DITROPAN) 5 MG Tab Take 5 mg by mouth 3 (three) times daily.    valACYclovir (VALTREX) 500 MG tablet Take 500 mg by mouth once daily.     No current facility-administered medications for this visit.     Review of patient's allergies indicates:   Allergen Reactions    Avelox [moxifloxacin] Hives    Quinolones Hives       Subjective     Patient states:  Patient reports neck pain for greater than 5 years with no known injury. Patient has had imaging. Patient also complains of severe headaches and therapy was recommended. Patient reports discomfort described as a lot of pressure when really bad and increases with cervical rotation. Patient reports headaches a couple of times per week and lasts for a few days. Neck feels heavy per patient report at times. Patient unable to sleep on her stomach. Patient reports tingling in upper arms (B) when laying on back occasionally. Did have some tingling (B) fingertips but went away after nerve conduction test. Patient also reports (L) scapular pain mostly in the morning when she first wakes up. Patient is (R) hand dominant. Patient in sales but painted for about 15 years but did not have any neck or UE pain when doing these activities regularly. Patient reports fluctuation in frequency, intensity and duration of pain symptoms.   Pain Scale: Caridad rates pain on a scale of 0-10 to be 8 at worst;  1-2  currently but patient took tylenol this morning.     "                  Pts goals:  to be pain free.     Objective     Posture Alignment: no gross postural deficits noted.   Palpation: tenderness to palpation over (L) scapula; decreased (L) scapula mobility  CERVICAL SPINE AROM:   Flexion: 52   Extension: 56   Left Sidebend: 41   Right Sidebend: 36   Left Rotation: 65   Right Rotation: 60     SEGMENTAL MOBILITY: WNL    UPPER EXTREMITY STRENGTH:   Left Right   Shoulder Flexion 4+/5 4+/5   Shoulder Abduction 4+/5 4+/5   Shoulder Internal Rotation 4+/5 4+/5   Shoulder External Rotation 4+/5 4+/5   Elbow Flexion  4+/5 4+/5   Elbow Extension 4+/5 4+/5   Wrist Flexion 4+/5 4+/5   Wrist Extension 4+/5 4+/5    firm firm     UPPER EXTREMITY ROM: (B) UE ROM WNL.     Dermatomes: Sensation: Light Touch: Intact  Myotomes: intact    FLEXIBILITY:WNL    Special Tests:   Left Right   Compression negative negative   Dystraction positive negative   Spurling negative negative     Pt/family was provided educational information, including: role of PT, goals for PT, scheduling - pt verbalized understanding. Discussed insurance limitations with pt.       NECK PAIN DISABILITY INDEX QUESTIONNAIRE - The following scores are based on patient reported assessment, with "0" being least limited and "5" being most limited.  14/50=28%  Section 1- Pain Intensity  1/5     Section 2 Personal Care  0/5  Section 3 Lifting  0/5     Section 4 Reading  0/5  Section 5 Headaches   5/5     Section 6 Concentration 2/5  Section 7 Work   1/5     Section 8 Driving  2/5  Section 9 Sleeping  1/5     Section 10 Recreation  2/5     Treatment     Time In: 1500  Time Out: 1540    PT Evaluation Completed? Yes  Discussed Plan of Care with patient: Yes    Written Home Exercises Provided: will be provided future appt.     Assessment     Pt prognosis is Good.  Pt will benefit from skilled outpatient physical therapy to address the above stated deficits, provide pt/family education and to maximize pt's level of " independence.     Medical necessity is demonstrated by the following IMPAIRMENTS/PROBLEMS:  1. Increased Pain  2. Decreased ROM  3. Decreased UE strength  4. Decreased mobility (L) scapula  5. Decreased Tolerance to Functional Activities    Pt's spiritual, cultural and educational needs considered and pt agreeable to plan of care and goals as stated below:     Anticipated Barriers for physical therapy: none    Short Term GOALS: 3 weeks. Pt agrees with goals set.  1. Patient demonstrates independence with HEP.   2. Patient demonstrates independence with Postural Awareness.   3. Patient demonstrates independence with body mechanics.   4. Patient reports decreased complaints of neck pain to 3/10 at worse.     Long Term GOALS: 6 weeks. Pt agrees with goals set.  1. Patient demonstrates increased cervical ROM to improve tolerance to functional activities.   2. Patient demonstrates performance of daily activities with minimal to no complaints.  3. Patient demonstrates improved overall function per NDI to 10% or less.   4. Patient reports decreased complaints of scapular pain symptoms.   5. Patient reports decreased frequency, intensity and duration of headaches related to neck pain symptoms.     Plan     Outpatient physical therapy 2 times weekly to include: pt ed, hep, therapeutic exercises, neuromuscular re-education/ balance exercises, manual therapy and modalities prn including but not limited to MHP, CP, electrical stimulation, ultrasound. Cont PT for  6 weeks. Pt may be seen by PTA as part of the rehabilitation team.   Certification dates: 2/7/2023-3/24/2023    Therapist: Clarice Malcolm, PT       MD Certification     [] I certify that I have reviewed this PT Evaluation   and I am in agreement with the Plan of Care.     MD:     Date:

## 2023-02-07 PROBLEM — M54.12 CERVICAL RADICULOPATHY: Status: ACTIVE | Noted: 2023-02-07

## 2023-02-22 ENCOUNTER — TELEPHONE (OUTPATIENT)
Dept: REHABILITATION | Facility: HOSPITAL | Age: 53
End: 2023-02-22
Payer: MEDICAID

## 2023-02-27 ENCOUNTER — TELEPHONE (OUTPATIENT)
Dept: REHABILITATION | Facility: HOSPITAL | Age: 53
End: 2023-02-27
Payer: MEDICAID

## 2023-02-28 ENCOUNTER — CLINICAL SUPPORT (OUTPATIENT)
Dept: REHABILITATION | Facility: HOSPITAL | Age: 53
End: 2023-02-28
Payer: MEDICAID

## 2023-02-28 DIAGNOSIS — M54.12 CERVICAL RADICULOPATHY: Primary | ICD-10-CM

## 2023-02-28 PROCEDURE — 97110 THERAPEUTIC EXERCISES: CPT

## 2023-02-28 NOTE — PROGRESS NOTES
"                                                    Physical Therapy Daily Note     Name: Caridad Burton [facility] Number: [id number]  Diagnosis:   Encounter Diagnosis   Name Primary?    Cervical radiculopathy Yes     Physician: Daquan Ponce Jr., *  Precautions: none  Visit #: 1 of 12  PTA Visit #: 0  Time In: 0805  Time Out: 0908    Subjective     Pt reports: Minimal complaints of neck pain.   Pain Scale: Caridad rates pain on a scale of 0-10 to be 1 currently.     Objective     Caridad received individual therapeutic exercises to develop strength, endurance, ROM, flexibility, posture, and core stabilization for 63 minutes including:  All BOLD exercises performed today:   (B) UE bicycle 4 minutes forward and 4 minutes backwards.   2x10 Cervical ROM flex/ext/lat flex/rot  2x10 YTB Horizontal Abd  2x10 YTB ROW High/Low  2x10 5" wall protraction  2x10 Posterior shoulder rolls    Caridad received the following manual therapy techniques: Soft tissue Mobilization were applied to the: cervical paraspinals, suboccipitals and upper trap for 10 minutes: Attempted IASTM however patient unable to tolerate. PT performed manual soft tissue mobilization.      The patient received the following direct contact modalities after being cleared for contraindications: none    The patient received the following supervised modalities after being cleared for contradictions: MHP to neck and Upper back with patient supine following treatment.     Written Home Exercises Provided: see  for details.   Pt demo good understanding of the education provided. Caridad demonstrated good return demonstration of activities.     Education provided re: exercise technique, modalities, manual therapy  Caridad verbalized good understanding of education provided.   No spiritual or educational barriers to learning provided    Assessment     Patient tolerated treatment well. Patient with increased discomfort during exercise program but " relieved after treatment. PT will continue to progress per as appropriate and tolerable by patient.   This is a 52 y.o. female referred to outpatient physical therapy and presents with a medical diagnosis of   Encounter Diagnosis   Name Primary?    Cervical radiculopathy Yes    and demonstrates limitations as described in the problem list. Pt prognosis is Good. Pt will continue to benefit from skilled outpatient physical therapy to address the deficits listed in the problem list, provide pt/family education and to maximize pt's level of independence in the home and community environment.     Goals as follows:  Short Term GOALS: 3 weeks. Pt agrees with goals set.  1. Patient demonstrates independence with HEP.   2. Patient demonstrates independence with Postural Awareness.   3. Patient demonstrates independence with body mechanics.   4. Patient reports decreased complaints of neck pain to 3/10 at worse.      Long Term GOALS: 6 weeks. Pt agrees with goals set.  1. Patient demonstrates increased cervical ROM to improve tolerance to functional activities.   2. Patient demonstrates performance of daily activities with minimal to no complaints.  3. Patient demonstrates improved overall function per NDI to 10% or less.   4. Patient reports decreased complaints of scapular pain symptoms.   5. Patient reports decreased frequency, intensity and duration of headaches related to neck pain symptoms.      Plan     Continue with established Plan of Care towards PT goals.    Therapist: Clarice Malcolm, PT  2/28/2023

## 2023-03-01 ENCOUNTER — TELEPHONE (OUTPATIENT)
Dept: REHABILITATION | Facility: HOSPITAL | Age: 53
End: 2023-03-01

## 2023-03-02 ENCOUNTER — CLINICAL SUPPORT (OUTPATIENT)
Dept: REHABILITATION | Facility: HOSPITAL | Age: 53
End: 2023-03-02
Payer: MEDICAID

## 2023-03-02 DIAGNOSIS — M54.12 CERVICAL RADICULOPATHY: Primary | ICD-10-CM

## 2023-03-02 PROCEDURE — 97110 THERAPEUTIC EXERCISES: CPT

## 2023-03-02 NOTE — PROGRESS NOTES
"                                                    Physical Therapy Daily Note     Name: Caridad Burton [facility] Number: [id number]  Diagnosis:   Encounter Diagnosis   Name Primary?    Cervical radiculopathy Yes     Physician: Daquan Ponce Jr., *  Precautions: none  Visit #: 2 of 12  PTA Visit #: 0  Time In: 0805  Time Out: 0904    Subjective     Pt reports: minimal complaints of neck and left trap and scapula pain. Patient reports no headache today and decreased occurrences of headaches.    Pain Scale: Caridad rates pain on a scale of 0-10 to be 2 currently.     Objective     Caridad received individual therapeutic exercises to develop strength, endurance, ROM, flexibility, posture, and core stabilization for 44 minutes including:  All BOLD exercises performed today:   (B) UE bicycle 4 minutes forward and 4 minutes backwards.   3x10 Cervical ROM flex/ext/lat flex/rot  3x10 YTB Horizontal Abd  3x10 YTB ROW High/Low  3x10 5" wall protraction  3x10 Lat pulls YTB  3x10 Posterior shoulder rolls  4x15" Protraction Stretch  4x30" UT/Levator  3x10 Scapular Retraction  20x5" Chin Tucks    Caridad received the following manual therapy techniques: Manual Soft tissue Mobilization were applied to the: cervical paraspinals, suboccipitals and upper trap for 10 minutes. Manual (L) scapula mobilizations  in all directions to improve mobility x5 minutes.       The patient received the following direct contact modalities after being cleared for contraindications: none    The patient received the following supervised modalities after being cleared for contradictions: none    Written Home Exercises Provided: see  for details.   Pt demo good understanding of the education provided. Caridad demonstrated good return demonstration of activities.     Education provided re: exercise technique, modalities, manual therapy  Caridad verbalized good understanding of education provided.   No spiritual or educational " barriers to learning provided    Assessment     Patient tolerated treatment well. Patient with decreased mobility (L) scapula in all directions due to decreased mobility today and patient reports of increased subscapular discomfort. Patient with decreased discomfort after scapular mobilizations. Patient reports decreased headaches since last therapy treatment but has not been performing HEP.     This is a 52 y.o. female referred to outpatient physical therapy and presents with a medical diagnosis of   Encounter Diagnosis   Name Primary?    Cervical radiculopathy Yes    and demonstrates limitations as described in the problem list. Pt prognosis is Good. Pt will continue to benefit from skilled outpatient physical therapy to address the deficits listed in the problem list, provide pt/family education and to maximize pt's level of independence in the home and community environment.     Goals as follows:  Short Term GOALS: 3 weeks. Pt agrees with goals set.  1. Patient demonstrates independence with HEP.   2. Patient demonstrates independence with Postural Awareness.   3. Patient demonstrates independence with body mechanics.   4. Patient reports decreased complaints of neck pain to 3/10 at worse.      Long Term GOALS: 6 weeks. Pt agrees with goals set.  1. Patient demonstrates increased cervical ROM to improve tolerance to functional activities.   2. Patient demonstrates performance of daily activities with minimal to no complaints.  3. Patient demonstrates improved overall function per NDI to 10% or less.   4. Patient reports decreased complaints of scapular pain symptoms.   5. Patient reports decreased frequency, intensity and duration of headaches related to neck pain symptoms.      Plan     Continue with established Plan of Care towards PT goals.    Therapist: Clarice Malcolm, PT  3/2/2023

## 2023-03-07 ENCOUNTER — TELEPHONE (OUTPATIENT)
Dept: REHABILITATION | Facility: HOSPITAL | Age: 53
End: 2023-03-07
Payer: MEDICAID

## 2023-03-07 PROBLEM — R39.15 URGENCY OF MICTURITION: Status: ACTIVE | Noted: 2023-03-07

## 2023-03-07 PROBLEM — R33.9 INCOMPLETE BLADDER EMPTYING: Status: ACTIVE | Noted: 2023-03-07

## 2023-03-07 PROBLEM — R31.29 MICROHEMATURIA: Status: ACTIVE | Noted: 2023-03-07

## 2023-03-07 PROBLEM — R35.0 FREQUENCY OF URINATION: Status: ACTIVE | Noted: 2023-03-07

## 2023-03-08 ENCOUNTER — CLINICAL SUPPORT (OUTPATIENT)
Dept: REHABILITATION | Facility: HOSPITAL | Age: 53
End: 2023-03-08
Payer: MEDICAID

## 2023-03-08 DIAGNOSIS — R10.0 ACUTE ABDOMINAL PAIN SYNDROME: Primary | ICD-10-CM

## 2023-03-08 DIAGNOSIS — M54.12 CERVICAL RADICULOPATHY: Primary | ICD-10-CM

## 2023-03-08 PROCEDURE — 97110 THERAPEUTIC EXERCISES: CPT

## 2023-03-08 NOTE — PROGRESS NOTES
"                                                    Physical Therapy Daily Note/Monthly Progress Note     Name: Caridad Burton [facility]  Clinic Number: [id number]  Diagnosis:   Encounter Diagnosis   Name Primary?    Cervical radiculopathy Yes     Physician: Daquan Ponce Jr., *  Precautions: none  Visit #: 3 of 12  PTA Visit #: 0  Time In: 0807  Time Out: 0902    Subjective     Pt reports: Minimal complaints of neck/upper back pain today.   Pain Scale: Caridad rates pain on a scale of 0-10 to be 2 currently.     Objective     Caridad received individual therapeutic exercises to develop strength, endurance, ROM, flexibility, posture, and core stabilization for 55 minutes including:  All BOLD exercises performed today:   (B) UE bicycle 4 minutes forward and 4 minutes backwards.   (B) UE omnicycle Ortho 4W 10 minutes total 5/5.   3x10 Cervical ROM flex/ext/lat flex/rot  3x10 RTB Horizontal Abd  3x10 RTB ROW High/Low  3x10 5" wall protraction  3x10 Lat pulls RTB  3x10 Posterior shoulder rolls  20 x 5" Protraction Stretch  4x30" UT/Levator  3x10 Scapular Retraction  20x5" Chin Tucks  X10 3 directions YB    Caridad received the following manual therapy techniques: Manual Soft tissue Mobilization were applied to the: cervical paraspinals, suboccipitals and upper trap for 8 minutes.     The patient received the following direct contact modalities after being cleared for contraindications: none    The patient received the following supervised modalities after being cleared for contradictions: none    Written Home Exercises Provided: see  for details.   Pt demo good understanding of the education provided. Caridad demonstrated good return demonstration of activities.     Education provided re: exercise technique, modalities, manual therapy  Caridad verbalized good understanding of education provided.   No spiritual or educational barriers to learning provided    CERVICAL SPINE AROM:   Flexion: WNL "   Extension: WNL   Left Sidebend: WNL   Right Sidebend: WNL   Left Rotation: WNL   Right Rotation: WNL       Assessment     Patient tolerated treatment well. PT will continue to progress patient per POC. PT continuing to focus on strengthening/stretching of cervical and upper back musculature to impact patient posture and performance of daily activities without pain symptoms.      This is a 52 y.o. female referred to outpatient physical therapy and presents with a medical diagnosis of   Encounter Diagnosis   Name Primary?    Cervical radiculopathy Yes    and demonstrates limitations as described in the problem list. Pt prognosis is Good. Pt will continue to benefit from skilled outpatient physical therapy to address the deficits listed in the problem list, provide pt/family education and to maximize pt's level of independence in the home and community environment.     Goals as follows:  Short Term GOALS: 3 weeks. Pt agrees with goals set.  1. Patient demonstrates independence with HEP. Met CB 3/8/2023  2. Patient demonstrates independence with Postural Awareness. Met CB 3/8/2023  3. Patient demonstrates independence with body mechanics. Met CB 3/8/2023  4. Patient reports decreased complaints of neck pain to 3/10 at worse. Progress CB 3/702111     Long Term GOALS: 6 weeks. Pt agrees with goals set.  1. Patient demonstrates increased cervical ROM to improve tolerance to functional activities. Progress CB 3/8/2023  2. Patient demonstrates performance of daily activities with minimal to no complaints.Progress CB 3/8/2023  3. Patient demonstrates improved overall function per NDI to 10% or less. Not assessed CB 3/8/2023  4. Patient reports decreased complaints of scapular pain symptoms. Progress CB 3/8/2023  5. Patient reports decreased frequency, intensity and duration of headaches related to neck pain symptoms. Progress CB 3/8/2023     Plan     Continue with established Plan of Care towards PT goals.    Therapist:  Crystal Gold, PT  3/8/2023

## 2023-03-09 ENCOUNTER — TELEPHONE (OUTPATIENT)
Dept: REHABILITATION | Facility: HOSPITAL | Age: 53
End: 2023-03-09
Payer: MEDICAID

## 2023-03-09 ENCOUNTER — OFFICE VISIT (OUTPATIENT)
Dept: SURGERY | Facility: CLINIC | Age: 53
End: 2023-03-09
Payer: MEDICAID

## 2023-03-09 VITALS
SYSTOLIC BLOOD PRESSURE: 116 MMHG | DIASTOLIC BLOOD PRESSURE: 68 MMHG | WEIGHT: 130.38 LBS | BODY MASS INDEX: 23.1 KG/M2 | HEIGHT: 63 IN | OXYGEN SATURATION: 98 % | HEART RATE: 110 BPM

## 2023-03-09 DIAGNOSIS — Z12.11 SCREEN FOR COLON CANCER: Primary | ICD-10-CM

## 2023-03-09 PROCEDURE — 1159F PR MEDICATION LIST DOCUMENTED IN MEDICAL RECORD: ICD-10-PCS | Mod: CPTII,,, | Performed by: STUDENT IN AN ORGANIZED HEALTH CARE EDUCATION/TRAINING PROGRAM

## 2023-03-09 PROCEDURE — 99999 PR PBB SHADOW E&M-EST. PATIENT-LVL V: ICD-10-PCS | Mod: PBBFAC,,, | Performed by: STUDENT IN AN ORGANIZED HEALTH CARE EDUCATION/TRAINING PROGRAM

## 2023-03-09 PROCEDURE — 99024 PR POST-OP FOLLOW-UP VISIT: ICD-10-PCS | Mod: ,,, | Performed by: STUDENT IN AN ORGANIZED HEALTH CARE EDUCATION/TRAINING PROGRAM

## 2023-03-09 PROCEDURE — 99024 POSTOP FOLLOW-UP VISIT: CPT | Mod: ,,, | Performed by: STUDENT IN AN ORGANIZED HEALTH CARE EDUCATION/TRAINING PROGRAM

## 2023-03-09 PROCEDURE — 3074F PR MOST RECENT SYSTOLIC BLOOD PRESSURE < 130 MM HG: ICD-10-PCS | Mod: CPTII,,, | Performed by: STUDENT IN AN ORGANIZED HEALTH CARE EDUCATION/TRAINING PROGRAM

## 2023-03-09 PROCEDURE — 3008F BODY MASS INDEX DOCD: CPT | Mod: CPTII,,, | Performed by: STUDENT IN AN ORGANIZED HEALTH CARE EDUCATION/TRAINING PROGRAM

## 2023-03-09 PROCEDURE — 3008F PR BODY MASS INDEX (BMI) DOCUMENTED: ICD-10-PCS | Mod: CPTII,,, | Performed by: STUDENT IN AN ORGANIZED HEALTH CARE EDUCATION/TRAINING PROGRAM

## 2023-03-09 PROCEDURE — 1159F MED LIST DOCD IN RCRD: CPT | Mod: CPTII,,, | Performed by: STUDENT IN AN ORGANIZED HEALTH CARE EDUCATION/TRAINING PROGRAM

## 2023-03-09 PROCEDURE — 99999 PR PBB SHADOW E&M-EST. PATIENT-LVL V: CPT | Mod: PBBFAC,,, | Performed by: STUDENT IN AN ORGANIZED HEALTH CARE EDUCATION/TRAINING PROGRAM

## 2023-03-09 PROCEDURE — 3074F SYST BP LT 130 MM HG: CPT | Mod: CPTII,,, | Performed by: STUDENT IN AN ORGANIZED HEALTH CARE EDUCATION/TRAINING PROGRAM

## 2023-03-09 PROCEDURE — 3078F PR MOST RECENT DIASTOLIC BLOOD PRESSURE < 80 MM HG: ICD-10-PCS | Mod: CPTII,,, | Performed by: STUDENT IN AN ORGANIZED HEALTH CARE EDUCATION/TRAINING PROGRAM

## 2023-03-09 PROCEDURE — 99215 OFFICE O/P EST HI 40 MIN: CPT | Mod: PBBFAC | Performed by: STUDENT IN AN ORGANIZED HEALTH CARE EDUCATION/TRAINING PROGRAM

## 2023-03-09 PROCEDURE — 3078F DIAST BP <80 MM HG: CPT | Mod: CPTII,,, | Performed by: STUDENT IN AN ORGANIZED HEALTH CARE EDUCATION/TRAINING PROGRAM

## 2023-03-09 RX ORDER — SODIUM CHLORIDE 0.9 % (FLUSH) 0.9 %
10 SYRINGE (ML) INJECTION
Status: CANCELLED | OUTPATIENT
Start: 2023-03-09

## 2023-03-09 RX ORDER — SODIUM CHLORIDE 9 MG/ML
INJECTION, SOLUTION INTRAVENOUS CONTINUOUS
Status: CANCELLED | OUTPATIENT
Start: 2023-03-09

## 2023-03-09 NOTE — PROGRESS NOTES
[facility]  General Surgery Clinic Progress Note    HPI:  Caridad Hope is a 52 y.o. female with history of incomplete colonoscopy due to poor prep who presents for follow up.  Reports improved bowel movements after attempted prep.      ROS:  Negative except above    PE:  There were no vitals filed for this visit.    Gen: Alert and oriented x3, judgement intact, NAD  CV: RRR  Resp: CTAB; breaths non-labored and symmetrical  Abd: Soft, NT, ND, BS+  Extremities: No c/c/e    A/P:  52 y.o. female with incomplete colonoscopy due to poor prep who presents for rescheduling   -To Endo 3/23 for colonoscopy   -Sutab poorly tolerated  -Miralax/gatorade - modified instructions given   -CLD starting evening of 3/21    Yarelis Paez MD  General Surgery   [phone]        3/9/2023  12:55 PM

## 2023-03-10 ENCOUNTER — HOSPITAL ENCOUNTER (OUTPATIENT)
Dept: RADIOLOGY | Facility: HOSPITAL | Age: 53
Discharge: HOME | End: 2023-03-10
Attending: INTERNAL MEDICINE
Payer: MEDICAID

## 2023-03-10 DIAGNOSIS — R10.0 ACUTE ABDOMINAL PAIN SYNDROME: ICD-10-CM

## 2023-03-10 PROCEDURE — 76700 US EXAM ABDOM COMPLETE: CPT | Mod: TC

## 2023-03-13 ENCOUNTER — TELEPHONE (OUTPATIENT)
Dept: REHABILITATION | Facility: HOSPITAL | Age: 53
End: 2023-03-13
Payer: MEDICAID

## 2023-03-14 ENCOUNTER — CLINICAL SUPPORT (OUTPATIENT)
Dept: REHABILITATION | Facility: HOSPITAL | Age: 53
End: 2023-03-14
Payer: MEDICAID

## 2023-03-14 DIAGNOSIS — M54.12 CERVICAL RADICULOPATHY: Primary | ICD-10-CM

## 2023-03-14 PROCEDURE — 97110 THERAPEUTIC EXERCISES: CPT

## 2023-03-16 ENCOUNTER — CLINICAL SUPPORT (OUTPATIENT)
Dept: REHABILITATION | Facility: HOSPITAL | Age: 53
End: 2023-03-16
Payer: MEDICAID

## 2023-03-16 DIAGNOSIS — M54.12 CERVICAL RADICULOPATHY: Primary | ICD-10-CM

## 2023-03-16 PROCEDURE — 97110 THERAPEUTIC EXERCISES: CPT

## 2023-03-16 NOTE — PROGRESS NOTES
"                                                    Physical Therapy Daily Note     Name: Caridad Burton [facility] Number: [id number]  Diagnosis:   Encounter Diagnosis   Name Primary?    Cervical radiculopathy Yes     Physician: Daquan Ponce Jr., *  Precautions: none  Visit #: 5 of 12  PTA Visit #: 0  Time In: 1455  Time Out: 1529    Subjective     Pt reports: no neck pain today. Patient requesting to leave appt early due to personal reasons.   Pain Scale: Caridad rates pain on a scale of 0-10 to be 0 currently.     Objective     Caridad received individual therapeutic exercises to develop strength, endurance, ROM, flexibility, posture, and core stabilization for 34 minutes including:  All BOLD exercises performed today:   (B) UE bicycle 4 minutes forward and 4 minutes backwards.   (B) UE omnicycle Ortho 8W 3/3.   3x10 Cervical ROM flex/ext/lat flex/rot  3x10 RTB Horizontal Abd  3x10 RTB ROW High/Low  3x10 Lat pulls RTB  3x10 Posterior shoulder rolls  20 x 5" Protraction Stretch  4x30" UT/Levator  3x10 Scapular Retraction  30x5" Chin Tucks  X20  flexion YB    Caridad received the following manual therapy techniques: Manual Soft tissue Mobilization were applied to the: cervical paraspinals, suboccipitals and upper trap for 0 minutes.     The patient received the following direct contact modalities after being cleared for contraindications: none    The patient received the following supervised modalities after being cleared for contradictions: none    Written Home Exercises Provided: see  for details.   Pt demo good understanding of the education provided. Caridad demonstrated good return demonstration of activities.     Education provided re: exercise technique, modalities, manual therapy  Caridad verbalized good understanding of education provided.   No spiritual or educational barriers to learning provided    Assessment     Patient with improving pain symptoms and tolerance to functional " activities. Patient requested ending session early. No pain symptoms today. Patient reported not consistently being compliant with HEP. Patient encouraged to at least perform cervical stretches at least once per day. PT will re-assess next session and will discharge if pain relief continues current trends.     This is a 52 y.o. female referred to outpatient physical therapy and presents with a medical diagnosis of   Encounter Diagnosis   Name Primary?    Cervical radiculopathy Yes    and demonstrates limitations as described in the problem list. Pt prognosis is Good. Pt will continue to benefit from skilled outpatient physical therapy to address the deficits listed in the problem list, provide pt/family education and to maximize pt's level of independence in the home and community environment.     Goals as follows:  Short Term GOALS: 3 weeks. Pt agrees with goals set.  1. Patient demonstrates independence with HEP. Met CB 3/8/2023  2. Patient demonstrates independence with Postural Awareness. Met CB 3/8/2023  3. Patient demonstrates independence with body mechanics. Met CB 3/8/2023  4. Patient reports decreased complaints of neck pain to 3/10 at worse. Progress CB 3/082133     Long Term GOALS: 6 weeks. Pt agrees with goals set.  1. Patient demonstrates increased cervical ROM to improve tolerance to functional activities. Progress CB 3/8/2023  2. Patient demonstrates performance of daily activities with minimal to no complaints.Progress CB 3/8/2023  3. Patient demonstrates improved overall function per NDI to 10% or less. Not assessed CB 3/8/2023  4. Patient reports decreased complaints of scapular pain symptoms. Progress CB 3/8/2023  5. Patient reports decreased frequency, intensity and duration of headaches related to neck pain symptoms. Progress CB 3/8/2023      Plan     Continue with established Plan of Care towards PT goals.     Therapist: Clarice Malcolm, PT  3/16/2023

## 2023-03-17 NOTE — DISCHARGE INSTRUCTIONS
BEFORE THE PROCEDURE:    REPORT ANY CHANGE IN YOUR PHYSICAL CONDITION TO YOUR DOCTOR IMMEDIATELY.  SELF ISOLATE AND CHECK TEMPERATURE DAILY, IF TEMP OVER 100, CALL PHYSICIAN IMMEDIATELY.    TRY TO REFRAIN FROM SMOKING AND ALCOHOL 72 HOURS BEFORE YOUR PROCEDURE.     THE DAY BEFORE YOUR PROCEDURE YOU WILL BE ON A  LIQUID DIET FROM WAKING IN THE MORNING UNTIL MIDNIGHT.    REFER TO YOUR PHYSICIANS INSTRUCTIONS ON LIQUID DIET AND COLON PREP.    SOMEONE WILL CALL YOU THE DAY BEFORE YOUR PROCEDURE WITH A CHECK-IN TIME FOR YOUR PROCEDURE.    CHECK IN AT FIRST FLOOR REGISTRATION DESK.     DAY OF YOUR PROCEDURE:    NO MAKE UP, NAIL POLISH OR JEWELRY.  TAKE BLOOD PRESSURE MEDICATIONS THE MORNING OF YOUR PROCEDURE, WITH SMALL SIPS WATER, AS DIRECTED BY YOUR PHYSICIAN.   DO NOT TAKE ANY DIABETIC MEDICATIONS UNLESS DIRECTED TO DO SO BY YOUR PHYSICIAN.   CONTACT LENSES AND DENTURES MUST BE REMOVED.  A RESPONSIBLE ADULT MUST ACCOMPANY YOU HOME UPON DISCHARGE.   ONLY 1 VISITOR ALLOWED PER ROOM.     YOUR THOUGHTS AND OPINIONS HELP US TO BETTER SERVE YOU.     PLEASE PARTICIPATE IN SURVEYS ABOUT YOUR CARE.    THANK YOU FOR CHOOSING [facility].

## 2023-03-20 ENCOUNTER — HOSPITAL ENCOUNTER (OUTPATIENT)
Dept: PREADMISSION TESTING | Facility: HOSPITAL | Age: 53
Discharge: HOME | End: 2023-03-20
Attending: STUDENT IN AN ORGANIZED HEALTH CARE EDUCATION/TRAINING PROGRAM
Payer: MEDICAID

## 2023-03-20 ENCOUNTER — TELEPHONE (OUTPATIENT)
Dept: REHABILITATION | Facility: HOSPITAL | Age: 53
End: 2023-03-20
Payer: MEDICAID

## 2023-03-20 VITALS — HEIGHT: 63 IN | WEIGHT: 130 LBS | BODY MASS INDEX: 23.04 KG/M2

## 2023-03-21 ENCOUNTER — CLINICAL SUPPORT (OUTPATIENT)
Dept: REHABILITATION | Facility: HOSPITAL | Age: 53
End: 2023-03-21
Payer: MEDICAID

## 2023-03-21 DIAGNOSIS — M54.12 CERVICAL RADICULOPATHY: Primary | ICD-10-CM

## 2023-03-21 PROCEDURE — 97110 THERAPEUTIC EXERCISES: CPT

## 2023-03-21 NOTE — PROGRESS NOTES
"                                                    Physical Therapy Daily Note     Name: Caridad Burton [facility] Number: [id number]  Diagnosis:   Encounter Diagnosis   Name Primary?    Cervical radiculopathy Yes     Physician: Daquan Ponce Jr., *  Precautions: none  Visit #: 6 of 12  PTA Visit #: 0  Time In: 0806  Time Out: 0855    Subjective     Pt reports: minimal complaints of pain today. Patient reports noncompliance with performance of HEP.   Pain Scale: Caridad rates pain on a scale of 0-10 to be 2 currently.     Objective     Caridad received individual therapeutic exercises to develop strength, endurance, ROM, flexibility, posture, and core stabilization for 49 minutes including:  All BOLD exercises performed today:   (B) UE omnicycle Ortho 8W 5/5.   3x10 Cervical ROM flex/ext/lat flex/rot  3x10 RTB Horizontal Abd  3x10 RTB ROW High/Low  3x10 Lat pulls RTB  3x10 Posterior shoulder rolls  20 x 5" Protraction Stretch  4x30" UT/Levator  3x10 Scapular Retraction  30x5" Chin Tucks  X10  flexion YSB 3D    Caridad received the following manual therapy techniques: Manual Soft tissue Mobilization were applied to the: cervical paraspinals, suboccipitals and upper trap for 0 minutes.     The patient received the following direct contact modalities after being cleared for contraindications: none    The patient received the following supervised modalities after being cleared for contradictions: none    Written Home Exercises Provided: see  for details.   Pt demo good understanding of the education provided. Caridad demonstrated good return demonstration of activities.     Education provided re: exercise technique, modalities, manual therapy  Caridad verbalized good understanding of education provided.   No spiritual or educational barriers to learning provided    Assessment     Patient with improving pain symptoms and tolerance to functional activities; however, patient reports fatigue after " therapy session. Patient encouraged to perform HEP and at least performance of stretches. Patient will be re-assessed next visit for possible discharge.   This is a 52 y.o. female referred to outpatient physical therapy and presents with a medical diagnosis of   Encounter Diagnosis   Name Primary?    Cervical radiculopathy Yes    and demonstrates limitations as described in the problem list. Pt prognosis is Good. Pt will continue to benefit from skilled outpatient physical therapy to address the deficits listed in the problem list, provide pt/family education and to maximize pt's level of independence in the home and community environment.     Goals as follows:  Short Term GOALS: 3 weeks. Pt agrees with goals set.  1. Patient demonstrates independence with HEP. Met CB 3/8/2023  2. Patient demonstrates independence with Postural Awareness. Met CB 3/8/2023  3. Patient demonstrates independence with body mechanics. Met CB 3/8/2023  4. Patient reports decreased complaints of neck pain to 3/10 at worse. Progress CB [id number]     Long Term GOALS: 6 weeks. Pt agrees with goals set.  1. Patient demonstrates increased cervical ROM to improve tolerance to functional activities. Progress CB 3/8/2023  2. Patient demonstrates performance of daily activities with minimal to no complaints.Progress CB 3/8/2023  3. Patient demonstrates improved overall function per NDI to 10% or less. Not assessed CB 3/8/2023  4. Patient reports decreased complaints of scapular pain symptoms. Progress CB 3/8/2023  5. Patient reports decreased frequency, intensity and duration of headaches related to neck pain symptoms. Progress CB 3/8/2023      Plan     Continue with established Plan of Care towards PT goals. Probable discharge next visit.     Therapist: Clarice Malcolm, PT  3/21/2023

## 2023-03-22 ENCOUNTER — TELEPHONE (OUTPATIENT)
Dept: REHABILITATION | Facility: HOSPITAL | Age: 53
End: 2023-03-22
Payer: MEDICAID

## 2023-03-24 ENCOUNTER — LAB VISIT (OUTPATIENT)
Dept: LAB | Facility: HOSPITAL | Age: 53
End: 2023-03-24
Payer: MEDICAID

## 2023-03-24 DIAGNOSIS — E55.9 VITAMIN D DEFICIENCY: Primary | ICD-10-CM

## 2023-03-24 DIAGNOSIS — R53.83 FATIGUE: ICD-10-CM

## 2023-03-24 LAB
BASOPHILS # BLD AUTO: 0.04 K/UL (ref 0–0.2)
BASOPHILS NFR BLD: 0.7 % (ref 0–1.9)
DIFFERENTIAL METHOD: ABNORMAL
EOSINOPHIL # BLD AUTO: 0.1 K/UL (ref 0–0.5)
EOSINOPHIL NFR BLD: 1.7 % (ref 0–8)
ERYTHROCYTE [DISTWIDTH] IN BLOOD BY AUTOMATED COUNT: 13.5 % (ref 11.5–14.5)
HCT VFR BLD AUTO: 30.2 % (ref 37–48.5)
HGB BLD-MCNC: 9.7 G/DL (ref 12–16)
IMM GRANULOCYTES # BLD AUTO: 0.02 K/UL (ref 0–0.04)
IMM GRANULOCYTES NFR BLD AUTO: 0.3 % (ref 0–0.5)
LYMPHOCYTES # BLD AUTO: 1.6 K/UL (ref 1–4.8)
LYMPHOCYTES NFR BLD: 26.7 % (ref 18–48)
MCH RBC QN AUTO: 28.5 PG (ref 27–31)
MCHC RBC AUTO-ENTMCNC: 32.1 G/DL (ref 32–36)
MCV RBC AUTO: 89 FL (ref 82–98)
MONOCYTES # BLD AUTO: 0.4 K/UL (ref 0.3–1)
MONOCYTES NFR BLD: 6.5 % (ref 4–15)
NEUTROPHILS # BLD AUTO: 3.9 K/UL (ref 1.8–7.7)
NEUTROPHILS NFR BLD: 64.1 % (ref 38–73)
NRBC BLD-RTO: 0 /100 WBC
PLATELET # BLD AUTO: 369 K/UL (ref 150–450)
PMV BLD AUTO: 9 FL (ref 9.2–12.9)
RBC # BLD AUTO: 3.4 M/UL (ref 4–5.4)
VIT B12 SERPL-MCNC: 526 PG/ML (ref 210–950)
WBC # BLD AUTO: 6.03 K/UL (ref 3.9–12.7)

## 2023-03-24 PROCEDURE — 85025 COMPLETE CBC W/AUTO DIFF WBC: CPT

## 2023-03-24 PROCEDURE — 82607 VITAMIN B-12: CPT

## 2023-03-24 PROCEDURE — 36415 COLL VENOUS BLD VENIPUNCTURE: CPT

## 2023-03-27 ENCOUNTER — ANESTHESIA EVENT (OUTPATIENT)
Dept: ENDOSCOPY | Facility: HOSPITAL | Age: 53
End: 2023-03-27
Payer: MEDICAID

## 2023-03-27 NOTE — ANESTHESIA PREPROCEDURE EVALUATION
03/27/2023  Caridad Hope is a 52 y.o., female.      Pre-op Assessment    I have reviewed the Patient Summary Reports.    I have reviewed the NPO Status.   I have reviewed the Medications.     Review of Systems  Anesthesia Hx:  No problems with previous Anesthesia  Denies Family Hx of Anesthesia complications.   Denies Personal Hx of Anesthesia complications.   Social:  Non-Smoker    Cardiovascular:  Cardiovascular Normal  ECG has been reviewed.    Pulmonary:  Pulmonary Normal    Renal/:  Renal/ Normal     Hepatic/GI:  Hepatic/GI Normal    Neurological:   Neuromuscular Disease, CERVICAL RADICULOPATHY   Endocrine:  Endocrine Normal      Lab Results   Component Value Date    WBC 6.03 03/24/2023    HGB 9.7 (L) 03/24/2023    HCT 30.2 (L) 03/24/2023    MCV 89 03/24/2023     03/24/2023     CMP  Sodium   Date Value Ref Range Status   01/18/2023 139 136 - 145 mmol/L Final     Potassium   Date Value Ref Range Status   01/18/2023 4.1 3.5 - 5.1 mmol/L Final     Chloride   Date Value Ref Range Status   01/18/2023 105 95 - 110 mmol/L Final     CO2   Date Value Ref Range Status   01/18/2023 30 (H) 23 - 29 mmol/L Final     Glucose   Date Value Ref Range Status   01/18/2023 93 70 - 110 mg/dL Final     BUN   Date Value Ref Range Status   01/18/2023 18 6 - 20 mg/dL Final     Creatinine   Date Value Ref Range Status   01/18/2023 0.8 0.5 - 1.4 mg/dL Final     Calcium   Date Value Ref Range Status   01/18/2023 9.0 8.7 - 10.5 mg/dL Final     Total Protein   Date Value Ref Range Status   01/18/2023 7.3 6.0 - 8.4 g/dL Final     Albumin   Date Value Ref Range Status   01/18/2023 3.7 3.5 - 5.2 g/dL Final     Total Bilirubin   Date Value Ref Range Status   01/18/2023 0.3 0.1 - 1.0 mg/dL Final     Comment:     For infants and newborns, interpretation of results should be based  on gestational age, weight and in  agreement with clinical  observations.    Premature Infant recommended reference ranges:  Up to 24 hours.............<8.0 mg/dL  Up to 48 hours............<12.0 mg/dL  3-5 days..................<15.0 mg/dL  6-29 days.................<15.0 mg/dL    For patients on Eltrombopag therapy, use of Dimension [place] TBIL is   not   recommended.       Alkaline Phosphatase   Date Value Ref Range Status   01/18/2023 93 55 - 135 U/L Final     AST   Date Value Ref Range Status   01/18/2023 14 10 - 40 U/L Final     ALT   Date Value Ref Range Status   01/18/2023 18 10 - 44 U/L Final     Anion Gap   Date Value Ref Range Status   01/18/2023 4 (L) 8 - 16 mmol/L Final     eGFR   Date Value Ref Range Status   01/18/2023 >60.0 >60 mL/min/1.73 m^2 Final       Physical Exam  General: Well nourished    Airway:  Mallampati: II / I  Mouth Opening: Normal  TM Distance: Normal  Tongue: Normal  Neck ROM: Normal ROM    Dental:  Intact    Chest/Lungs:  Clear to auscultation    Heart:  Rate: Normal  Rhythm: Regular Rhythm  Sounds: Normal        Anesthesia Plan  Type of Anesthesia, risks & benefits discussed:    Anesthesia Type: MAC  Intra-op Monitoring Plan: Standard ASA Monitors  Post Op Pain Control Plan: multimodal analgesia  Induction:  IV  Airway Plan: Direct  Informed Consent: Informed consent signed with the Patient and all parties understand the risks and agree with anesthesia plan.  All questions answered.   ASA Score: 1  Day of Surgery Review of History & Physical: I have interviewed and examined the patient. I have reviewed the patient's H&P dated: There are no significant changes.     Ready For Surgery From Anesthesia Perspective.     .

## 2023-03-28 ENCOUNTER — DOCUMENTATION ONLY (OUTPATIENT)
Dept: REHABILITATION | Facility: HOSPITAL | Age: 53
End: 2023-03-28
Payer: MEDICAID

## 2023-03-28 RX ORDER — SODIUM, POTASSIUM,MAG SULFATES 17.5-3.13G
1 SOLUTION, RECONSTITUTED, ORAL ORAL 2 TIMES DAILY
Qty: 1 KIT | Refills: 0 | Status: ON HOLD | OUTPATIENT
Start: 2023-03-28 | End: 2023-03-29 | Stop reason: HOSPADM

## 2023-03-28 NOTE — PROGRESS NOTES
"PHYSICAL THERAPY DISCHARGE:    This patient was originally evaluated at our facility on: 2/6/2023         Pt attended PT for a total of 6 Visits receiving: Manual Therapy, Therex, Postural Education, Body Mechanics Training, Home Exercise Instruction     This patient's last visit occurred on: 3/21/2023                                                         Physical Therapy Daily Note      [id number]  Diagnosis:        Encounter Diagnosis   Name Primary?    Cervical radiculopathy Yes      Physician: Daquan Ponce Jr., *  Precautions: none  Visit #: 6 of 12  PTA Visit #: 0  Time In: 0806  Time Out: 0855     Subjective      Pt reports: minimal complaints of pain today. Patient reports noncompliance with performance of HEP.   Pain Scale: Caridad rates pain on a scale of 0-10 to be 2 currently.      Objective      Caridad received individual therapeutic exercises to develop strength, endurance, ROM, flexibility, posture, and core stabilization for 49 minutes including:  All BOLD exercises performed today:   (B) UE omnicycle Ortho 8W 5/5.   3x10 Cervical ROM flex/ext/lat flex/rot  3x10 RTB Horizontal Abd  3x10 RTB ROW High/Low  3x10 Lat pulls RTB  3x10 Posterior shoulder rolls  20 x 5" Protraction Stretch  4x30" UT/Levator  3x10 Scapular Retraction  30x5" Chin Tucks  X10  flexion YSB 3D     Caridad received the following manual therapy techniques: Manual Soft tissue Mobilization were applied to the: cervical paraspinals, suboccipitals and upper trap for 0 minutes.      The patient received the following direct contact modalities after being cleared for contraindications: none     The patient received the following supervised modalities after being cleared for contradictions: none     Written Home Exercises Provided: see  for details.   Pt demo good understanding of the education provided. Caridad demonstrated good return demonstration of activities.    "   Education provided re: exercise technique, modalities, manual therapy  Caridad verbalized good understanding of education provided.   No spiritual or educational barriers to learning provided     Assessment      Patient with improving pain symptoms and tolerance to functional activities; however, patient reports fatigue after therapy session. Patient encouraged to perform HEP and at least performance of stretches. Patient will be re-assessed next visit for possible discharge.   This is a 52 y.o. female referred to outpatient physical therapy and presents with a medical diagnosis of        Encounter Diagnosis   Name Primary?    Cervical radiculopathy Yes    and demonstrates limitations as described in the problem list. Pt prognosis is Good. Pt will continue to benefit from skilled outpatient physical therapy to address the deficits listed in the problem list, provide pt/family education and to maximize pt's level of independence in the home and community environment.      Goals as follows:  Short Term GOALS: 3 weeks. Pt agrees with goals set.  1. Patient demonstrates independence with HEP. Met CB 3/8/2023  2. Patient demonstrates independence with Postural Awareness. Met CB 3/8/2023  3. Patient demonstrates independence with body mechanics. Met CB 3/8/2023  4. Patient reports decreased complaints of neck pain to 3/10 at worse. Progress CB 3/[id number]     Long Term GOALS: 6 weeks. Pt agrees with goals set.  1. Patient demonstrates increased cervical ROM to improve tolerance to functional activities. Progress CB 3/8/2023  2. Patient demonstrates performance of daily activities with minimal to no complaints.Progress CB 3/8/2023  3. Patient demonstrates improved overall function per NDI to 10% or less. Not assessed CB 3/8/2023  4. Patient reports decreased complaints of scapular pain symptoms. Progress CB 3/8/2023  5. Patient reports decreased frequency, intensity and duration of headaches related to neck pain symptoms.  Progress CB 3/8/2023     Plan      Continue with established Plan of Care towards PT goals. Probable discharge next visit.      Therapist: Clarice Malcolm, PT  3/21/2023    Pt was DC'd from our care secondary to: Patient missed last appt scheduled and POC . PT was planning discharge from PT at that visit due to patient has made a significant progress with PT over the course of therapy treatment. Patient encouraged to perform HEP to maintain gains made with PT treatment.       Therapist: Clarice Malcolm, PT  3/28/2023

## 2023-03-29 ENCOUNTER — ANESTHESIA (OUTPATIENT)
Dept: ENDOSCOPY | Facility: HOSPITAL | Age: 53
End: 2023-03-29
Payer: MEDICAID

## 2023-03-29 ENCOUNTER — HOSPITAL ENCOUNTER (OUTPATIENT)
Facility: HOSPITAL | Age: 53
Discharge: HOME | End: 2023-03-29
Attending: STUDENT IN AN ORGANIZED HEALTH CARE EDUCATION/TRAINING PROGRAM | Admitting: STUDENT IN AN ORGANIZED HEALTH CARE EDUCATION/TRAINING PROGRAM
Payer: MEDICAID

## 2023-03-29 VITALS
TEMPERATURE: 98 F | DIASTOLIC BLOOD PRESSURE: 59 MMHG | SYSTOLIC BLOOD PRESSURE: 101 MMHG | HEART RATE: 80 BPM | RESPIRATION RATE: 18 BRPM | OXYGEN SATURATION: 100 %

## 2023-03-29 DIAGNOSIS — K63.89 COLONIC MASS: Primary | ICD-10-CM

## 2023-03-29 DIAGNOSIS — Z12.11 SCREEN FOR COLON CANCER: ICD-10-CM

## 2023-03-29 LAB — B-HCG UR QL: NEGATIVE

## 2023-03-29 PROCEDURE — 45381 COLONOSCOPY SUBMUCOUS NJX: CPT | Mod: PT,51,, | Performed by: STUDENT IN AN ORGANIZED HEALTH CARE EDUCATION/TRAINING PROGRAM

## 2023-03-29 PROCEDURE — 27201423 OPTIME MED/SURG SUP & DEVICES STERILE SUPPLY: Performed by: STUDENT IN AN ORGANIZED HEALTH CARE EDUCATION/TRAINING PROGRAM

## 2023-03-29 PROCEDURE — 45380 COLONOSCOPY AND BIOPSY: CPT | Mod: PT,74 | Performed by: STUDENT IN AN ORGANIZED HEALTH CARE EDUCATION/TRAINING PROGRAM

## 2023-03-29 PROCEDURE — 37000008 HC ANESTHESIA 1ST 15 MINUTES: Performed by: STUDENT IN AN ORGANIZED HEALTH CARE EDUCATION/TRAINING PROGRAM

## 2023-03-29 PROCEDURE — 45380 COLONOSCOPY AND BIOPSY: CPT | Mod: PT,52,, | Performed by: STUDENT IN AN ORGANIZED HEALTH CARE EDUCATION/TRAINING PROGRAM

## 2023-03-29 PROCEDURE — 00811 ANES LWR INTST NDSC NOS: CPT | Performed by: STUDENT IN AN ORGANIZED HEALTH CARE EDUCATION/TRAINING PROGRAM

## 2023-03-29 PROCEDURE — 81025 URINE PREGNANCY TEST: CPT | Performed by: STUDENT IN AN ORGANIZED HEALTH CARE EDUCATION/TRAINING PROGRAM

## 2023-03-29 PROCEDURE — 45381 PR COLONOSCPY,FLEX,W/DIR SUBMUC INJECT: ICD-10-PCS | Mod: PT,51,, | Performed by: STUDENT IN AN ORGANIZED HEALTH CARE EDUCATION/TRAINING PROGRAM

## 2023-03-29 PROCEDURE — 37000009 HC ANESTHESIA EA ADD 15 MINS: Performed by: STUDENT IN AN ORGANIZED HEALTH CARE EDUCATION/TRAINING PROGRAM

## 2023-03-29 PROCEDURE — 45381 COLONOSCOPY SUBMUCOUS NJX: CPT | Mod: PT | Performed by: STUDENT IN AN ORGANIZED HEALTH CARE EDUCATION/TRAINING PROGRAM

## 2023-03-29 PROCEDURE — 45380 PR COLONOSCOPY,BIOPSY: ICD-10-PCS | Mod: PT,52,, | Performed by: STUDENT IN AN ORGANIZED HEALTH CARE EDUCATION/TRAINING PROGRAM

## 2023-03-29 PROCEDURE — 25000003 PHARM REV CODE 250: Performed by: STUDENT IN AN ORGANIZED HEALTH CARE EDUCATION/TRAINING PROGRAM

## 2023-03-29 RX ORDER — PROPOFOL 10 MG/ML
VIAL (ML) INTRAVENOUS
Status: DISCONTINUED | OUTPATIENT
Start: 2023-03-29 | End: 2023-03-29

## 2023-03-29 RX ORDER — SODIUM CHLORIDE 9 MG/ML
INJECTION, SOLUTION INTRAVENOUS CONTINUOUS
Status: DISCONTINUED | OUTPATIENT
Start: 2023-03-29 | End: 2023-03-29 | Stop reason: HOSPADM

## 2023-03-29 RX ORDER — SODIUM CHLORIDE 0.9 % (FLUSH) 0.9 %
10 SYRINGE (ML) INJECTION
Status: DISCONTINUED | OUTPATIENT
Start: 2023-03-29 | End: 2023-03-29 | Stop reason: HOSPADM

## 2023-03-29 RX ADMIN — Medication 100 MG: at 08:03

## 2023-03-29 RX ADMIN — Medication 50 MG: at 08:03

## 2023-03-29 RX ADMIN — SODIUM CHLORIDE: 9 INJECTION, SOLUTION INTRAVENOUS at 06:03

## 2023-03-29 NOTE — DISCHARGE INSTRUCTIONS
FOLLOW UP WITH DR MOREIRA AS INSTRUCTED.    NO DRIVING OR DRINKING ALCOHOL FOR 24 HOURS.    CALL DR MOREIRA'S OFFICE FOR ANY QUESTIONS OR CONCERNS.  REPORT TO THE ER IF URGENT.    THANK YOU FOR CHOOSING [facility]!

## 2023-03-29 NOTE — PLAN OF CARE
Received pt to room 514 accompanied by CherieRN, pt aa&ox3, tearful, no c/o, snack provided. Call bell in reach, call with needs.

## 2023-03-29 NOTE — TRANSFER OF CARE
Anesthesia Transfer of Care Note    Patient: Caridad Hope    Procedure(s) Performed: Procedure(s) (LRB):  COLONOSCOPY, WITH 1 OR MORE BIOPSIES WITH ENDOSCOPIC TATTOO (N/A)    Patient location: GI    Anesthesia Type: MAC    Transport from OR: Transported from OR on room air with adequate spontaneous ventilation    Post pain: adequate analgesia    Post assessment: no apparent anesthetic complications    Post vital signs: stable    Level of consciousness: awake    Nausea/Vomiting: no nausea/vomiting    Complications: none    Transfer of care protocol was followed      Last vitals:   77 HR  91/53  16 RR  100% O2 SAT

## 2023-03-29 NOTE — ANESTHESIA POSTPROCEDURE EVALUATION
Anesthesia Post Evaluation    Patient: Caridad Hope    Procedure(s) Performed: Procedure(s) (LRB):  COLONOSCOPY, WITH 1 OR MORE BIOPSIES WITH ENDOSCOPIC TATTOO (N/A)    Final Anesthesia Type: MAC      Patient location during evaluation: OPS  Patient participation: Yes- Able to Participate  Level of consciousness: awake  Post-procedure vital signs: reviewed and stable  Pain management: adequate  Airway patency: patent    PONV status at discharge: No PONV  Anesthetic complications: no      Cardiovascular status: blood pressure returned to baseline  Respiratory status: spontaneous ventilation  Hydration status: euvolemic  Follow-up not needed.          Vitals Value Taken Time   /59 03/29/23 0904   Temp 36.6 °C (97.9 °F) 03/29/23 0904   Pulse 80 03/29/23 0904   Resp 18 03/29/23 0904   SpO2 100 % 03/29/23 0904         No case tracking events are documented in the log.      Pain/Anthony Score: Anthony Score: 10 (3/29/2023  9:04 AM)

## 2023-04-03 NOTE — OP NOTE
[facility] - OR Peri Services  General Surgery Department  Operative Note    SUMMARY     Date of Procedure: 3/29/2023    Procedure: Procedure(s) (LRB):  COLONOSCOPY, WITH 1 OR MORE BIOPSIES WITH ENDOSCOPIC TATTOO:      Surgeon(s) and Role:  Yarelis Paez MD     Pre-Operative Diagnosis: Pre-Op Diagnosis Codes:     * Screen for colon cancer [Z12.11]    Post-Operative Diagnosis:   Mass of sigmoid colon     Anesthesia: Local MAC      Findings:  Circumferential, fungating mass @ 40cm  Unable to pass scope  Multiple cold biopsies taken - area tattooed     Indications for the Procedure:  53yo female with no FH of CRC who presents for screening colonoscopy.     Operative Conduct in Detail:   The risks, benefits, and alternatives were thoroughly discussed with the patient. Despite the risks, the patient wished to proceed. Informed consent was obtained and it will scanned to the electronic chart.      The patient was placed on left lateral decubitus. After achieving moderate sedation, a digital rectal examination was performed; subsequently, a standard colonoscope was introduced through the anus and advanced to 40cm where a nearly obstructing mass was seen.  Unable to advance scope past lesion.  Multiple cold biopies taken.  Tattoo left in place.    Withdrawal time >6 minutes (if no biopsies/polypectomies obtained)      Complications: No    Estimated Blood Loss (EBL): Minimal              Specimens:   Jar # 1 - sigmoid lesion             Condition: Good    Disposition: PACU - hemodynamically stable.    Recommendation:    High concern for malignancy  Follow up pathology    Yarelis Paez MD  General Surgery   [phone]

## 2023-04-03 NOTE — DISCHARGE SUMMARY
[place] - Endoscopy  Discharge Note  Short Stay    Procedure(s) (LRB):  COLONOSCOPY, WITH 1 OR MORE BIOPSIES WITH ENDOSCOPIC TATTOO (N/A)      OUTCOME: Patient tolerated treatment/procedure well without complication and is now ready for discharge.    DISPOSITION: Home or Self Care    FINAL DIAGNOSIS:  Screen for colon cancer    FOLLOWUP: In clinic    DISCHARGE INSTRUCTIONS:    Discharge Procedure Orders   Diet Adult Regular     No driving until:     Notify your health care provider if you experience any of the following:  temperature >100.4     Notify your health care provider if you experience any of the following:  persistent nausea and vomiting or diarrhea     Notify your health care provider if you experience any of the following:  severe uncontrolled pain     Activity as tolerated         Clinical Reference Documents Added to Patient Instructions         Document    COLONOSCOPY DISCHARGE INSTRUCTIONS (ENGLISH)            TIME SPENT ON DISCHARGE: 5 minutes

## 2023-04-03 NOTE — H&P
Patient seen and examined.  No interval change since the below obtained H&P  Informed consent verified    NPO since midnight  Prep suprep; last BM liquid  All questions and concerns addressed  To Endo for colonoscopy    Yarelis Paez MD  General Surgery   [phone]        [facility] General Surgery Clinic Progress Note    HPI:  Caridad Hope is a 52 y.o. female with history of incomplete colonoscopy due to poor prep who presents for follow up.  Reports improved bowel movements after attempted prep.      ROS:  Negative except above    PE:  Vitals:    03/29/23 0904   BP: (!) 101/59   Pulse: 80   Resp: 18   Temp: 97.9 °F (36.6 °C)       Gen: Alert and oriented x3, judgement intact, NAD  CV: RRR  Resp: CTAB; breaths non-labored and symmetrical  Abd: Soft, NT, ND, BS+  Extremities: No c/c/e    A/P:  52 y.o. female with incomplete colonoscopy due to poor prep who presents for rescheduling   -To Endo 3/23 for colonoscopy   -Sutab poorly tolerated  -Miralax/gatorade - modified instructions given   -CLD starting evening of 3/21    Yarelis Paez MD  General Surgery   [phone]        4/3/2023  12:55 PM

## 2023-04-04 LAB — SPECIMEN TO PATHOLOGY - SURGICAL: NORMAL

## 2023-04-06 ENCOUNTER — OFFICE VISIT (OUTPATIENT)
Dept: SURGERY | Facility: CLINIC | Age: 53
End: 2023-04-06
Payer: MEDICAID

## 2023-04-06 VITALS
HEIGHT: 63 IN | SYSTOLIC BLOOD PRESSURE: 106 MMHG | HEART RATE: 98 BPM | BODY MASS INDEX: 23.04 KG/M2 | WEIGHT: 130 LBS | OXYGEN SATURATION: 100 % | DIASTOLIC BLOOD PRESSURE: 59 MMHG

## 2023-04-06 DIAGNOSIS — K63.89 COLONIC MASS: Primary | ICD-10-CM

## 2023-04-06 DIAGNOSIS — K56.600 PARTIAL OBSTRUCTION OF COLON: ICD-10-CM

## 2023-04-06 PROCEDURE — 99214 OFFICE O/P EST MOD 30 MIN: CPT | Mod: PBBFAC | Performed by: STUDENT IN AN ORGANIZED HEALTH CARE EDUCATION/TRAINING PROGRAM

## 2023-04-06 PROCEDURE — 99024 POSTOP FOLLOW-UP VISIT: CPT | Mod: ,,, | Performed by: STUDENT IN AN ORGANIZED HEALTH CARE EDUCATION/TRAINING PROGRAM

## 2023-04-06 PROCEDURE — 3074F SYST BP LT 130 MM HG: CPT | Mod: CPTII,,, | Performed by: STUDENT IN AN ORGANIZED HEALTH CARE EDUCATION/TRAINING PROGRAM

## 2023-04-06 PROCEDURE — 1159F MED LIST DOCD IN RCRD: CPT | Mod: CPTII,,, | Performed by: STUDENT IN AN ORGANIZED HEALTH CARE EDUCATION/TRAINING PROGRAM

## 2023-04-06 PROCEDURE — 1159F PR MEDICATION LIST DOCUMENTED IN MEDICAL RECORD: ICD-10-PCS | Mod: CPTII,,, | Performed by: STUDENT IN AN ORGANIZED HEALTH CARE EDUCATION/TRAINING PROGRAM

## 2023-04-06 PROCEDURE — 3074F PR MOST RECENT SYSTOLIC BLOOD PRESSURE < 130 MM HG: ICD-10-PCS | Mod: CPTII,,, | Performed by: STUDENT IN AN ORGANIZED HEALTH CARE EDUCATION/TRAINING PROGRAM

## 2023-04-06 PROCEDURE — 99999 PR PBB SHADOW E&M-EST. PATIENT-LVL IV: CPT | Mod: PBBFAC,,, | Performed by: STUDENT IN AN ORGANIZED HEALTH CARE EDUCATION/TRAINING PROGRAM

## 2023-04-06 PROCEDURE — 3078F PR MOST RECENT DIASTOLIC BLOOD PRESSURE < 80 MM HG: ICD-10-PCS | Mod: CPTII,,, | Performed by: STUDENT IN AN ORGANIZED HEALTH CARE EDUCATION/TRAINING PROGRAM

## 2023-04-06 PROCEDURE — 99024 PR POST-OP FOLLOW-UP VISIT: ICD-10-PCS | Mod: ,,, | Performed by: STUDENT IN AN ORGANIZED HEALTH CARE EDUCATION/TRAINING PROGRAM

## 2023-04-06 PROCEDURE — 3078F DIAST BP <80 MM HG: CPT | Mod: CPTII,,, | Performed by: STUDENT IN AN ORGANIZED HEALTH CARE EDUCATION/TRAINING PROGRAM

## 2023-04-06 PROCEDURE — 3008F BODY MASS INDEX DOCD: CPT | Mod: CPTII,,, | Performed by: STUDENT IN AN ORGANIZED HEALTH CARE EDUCATION/TRAINING PROGRAM

## 2023-04-06 PROCEDURE — 99999 PR PBB SHADOW E&M-EST. PATIENT-LVL IV: ICD-10-PCS | Mod: PBBFAC,,, | Performed by: STUDENT IN AN ORGANIZED HEALTH CARE EDUCATION/TRAINING PROGRAM

## 2023-04-06 PROCEDURE — 3008F PR BODY MASS INDEX (BMI) DOCUMENTED: ICD-10-PCS | Mod: CPTII,,, | Performed by: STUDENT IN AN ORGANIZED HEALTH CARE EDUCATION/TRAINING PROGRAM

## 2023-04-06 NOTE — H&P (VIEW-ONLY)
[facility]  General Surgery Clinic Progress Note    HPI:  Caridad Hope is a 52 y.o. female s/p colonoscopy with partially obstructing mass of the proximal sigmoid who presents for follow up.  Reports some abdominal bloating but continues to have flatus and soft bowel movements.  Here to discuss colonoscopy results and future surgical planning.     ROS:  Negative except above    PE:  Vitals:    04/06/23 1339   BP: (!) 106/59   Pulse: 98       Gen: Alert and oriented x3, judgement intact, NAD  CV: RRR  Resp: CTAB; breaths non-labored and symmetrical  Abd: Soft, NT, ND, BS+  Extremities: No c/c/e    Procedure:  Findings:  Circumferential, fungating mass @ 40cm  Unable to pass scope  Multiple cold biopsies taken - area tattooed     Pathology:      A/P:  52 y.o. female with partially obstruction mass of the proximal sigmoid s/p colonoscopy who presents for follow up  -High suspicion for malignancy despite biopsy results - will treat as such and proceed with staging workup and oncologic resection  -Results discussed with patient in detail with all questions and concerns addressed  -To OR 4/26 for lap left hemicolectomy   -CT Chest, CEA; patient with recent CT abd/pelvis  -To undergo cystoscopy with urology on 4/20 for microhematuria - will follow up results   -pre-op testing  -admit to IP post-op      Yarelis Paez MD  General Surgery   [phone]        4/6/2023  1:39 PM

## 2023-04-06 NOTE — PROGRESS NOTES
[facility]  General Surgery Clinic Progress Note    HPI:  Caridad Hope is a 52 y.o. female s/p colonoscopy with partially obstructing mass of the proximal sigmoid who presents for follow up.  Reports some abdominal bloating but continues to have flatus and soft bowel movements.  Here to discuss colonoscopy results and future surgical planning.     ROS:  Negative except above    PE:  Vitals:    04/06/23 1339   BP: (!) 106/59   Pulse: 98       Gen: Alert and oriented x3, judgement intact, NAD  CV: RRR  Resp: CTAB; breaths non-labored and symmetrical  Abd: Soft, NT, ND, BS+  Extremities: No c/c/e    Procedure:  Findings:  Circumferential, fungating mass @ 40cm  Unable to pass scope  Multiple cold biopsies taken - area tattooed     Pathology:      A/P:  52 y.o. female with partially obstruction mass of the proximal sigmoid s/p colonoscopy who presents for follow up  -High suspicion for malignancy despite biopsy results - will treat as such and proceed with staging workup and oncologic resection  -Results discussed with patient in detail with all questions and concerns addressed  -To OR 4/26 for lap left hemicolectomy   -CT Chest, CEA; patient with recent CT abd/pelvis  -To undergo cystoscopy with urology on 4/20 for microhematuria - will follow up results   -pre-op testing  -admit to IP post-op      Yarelis Paez MD  General Surgery   [phone]        4/6/2023  1:39 PM

## 2023-04-10 RX ORDER — ONDANSETRON 2 MG/ML
4 INJECTION INTRAMUSCULAR; INTRAVENOUS EVERY 12 HOURS PRN
Status: CANCELLED | OUTPATIENT
Start: 2023-04-10

## 2023-04-10 RX ORDER — SODIUM CHLORIDE 9 MG/ML
INJECTION, SOLUTION INTRAVENOUS CONTINUOUS
Status: CANCELLED | OUTPATIENT
Start: 2023-04-10

## 2023-04-10 RX ORDER — METOCLOPRAMIDE HYDROCHLORIDE 5 MG/ML
5 INJECTION INTRAMUSCULAR; INTRAVENOUS EVERY 6 HOURS PRN
Status: CANCELLED | OUTPATIENT
Start: 2023-04-10

## 2023-04-18 ENCOUNTER — HOSPITAL ENCOUNTER (OUTPATIENT)
Dept: PREADMISSION TESTING | Facility: HOSPITAL | Age: 53
Discharge: HOME | End: 2023-04-18
Attending: STUDENT IN AN ORGANIZED HEALTH CARE EDUCATION/TRAINING PROGRAM
Payer: MEDICAID

## 2023-04-18 ENCOUNTER — HOSPITAL ENCOUNTER (OUTPATIENT)
Dept: PULMONOLOGY | Facility: HOSPITAL | Age: 53
Discharge: HOME | End: 2023-04-18
Attending: STUDENT IN AN ORGANIZED HEALTH CARE EDUCATION/TRAINING PROGRAM
Payer: MEDICAID

## 2023-04-18 VITALS — WEIGHT: 128 LBS | HEIGHT: 63 IN | BODY MASS INDEX: 22.68 KG/M2

## 2023-04-18 DIAGNOSIS — Z01.818 PRE-OP TESTING: ICD-10-CM

## 2023-04-18 DIAGNOSIS — Z01.818 PRE-OP TESTING: Primary | ICD-10-CM

## 2023-04-18 PROCEDURE — 93005 ELECTROCARDIOGRAM TRACING: CPT

## 2023-04-18 PROCEDURE — 93010 EKG 12-LEAD: ICD-10-PCS | Mod: ,,, | Performed by: INTERNAL MEDICINE

## 2023-04-18 PROCEDURE — 93010 ELECTROCARDIOGRAM REPORT: CPT | Mod: ,,, | Performed by: INTERNAL MEDICINE

## 2023-04-18 RX ORDER — OXYBUTYNIN CHLORIDE 5 MG/1
5 TABLET ORAL 2 TIMES DAILY
COMMUNITY
Start: 2023-04-17 | End: 2023-04-20

## 2023-04-19 ENCOUNTER — HOSPITAL ENCOUNTER (OUTPATIENT)
Dept: RADIOLOGY | Facility: HOSPITAL | Age: 53
Discharge: HOME | End: 2023-04-19
Attending: STUDENT IN AN ORGANIZED HEALTH CARE EDUCATION/TRAINING PROGRAM
Payer: MEDICAID

## 2023-04-19 DIAGNOSIS — K63.89 COLONIC MASS: ICD-10-CM

## 2023-04-19 DIAGNOSIS — K56.600 PARTIAL OBSTRUCTION OF COLON: ICD-10-CM

## 2023-04-19 PROCEDURE — 25500020 PHARM REV CODE 255: Performed by: STUDENT IN AN ORGANIZED HEALTH CARE EDUCATION/TRAINING PROGRAM

## 2023-04-19 PROCEDURE — 71260 CT THORAX DX C+: CPT | Mod: TC

## 2023-04-19 RX ADMIN — IOHEXOL 80 ML: 350 INJECTION, SOLUTION INTRAVENOUS at 02:04

## 2023-04-20 PROBLEM — N32.81 OAB (OVERACTIVE BLADDER): Status: ACTIVE | Noted: 2023-04-20

## 2023-04-21 ENCOUNTER — ANESTHESIA EVENT (OUTPATIENT)
Dept: SURGERY | Facility: HOSPITAL | Age: 53
DRG: 331 | End: 2023-04-21
Payer: MEDICAID

## 2023-04-24 NOTE — ANESTHESIA PREPROCEDURE EVALUATION
04/24/2023  Caridad Hope is a 52 y.o., female.      Pre-op Assessment    I have reviewed the Patient Summary Reports.    I have reviewed the NPO Status.   I have reviewed the Medications.     Review of Systems  Anesthesia Hx:  No problems with previous Anesthesia  Denies Family Hx of Anesthesia complications.   Denies Personal Hx of Anesthesia complications.   Social:  Non-Smoker    Cardiovascular:  Cardiovascular Normal  ECG has been reviewed.    Pulmonary:  Pulmonary Normal    Renal/:  Renal/ Normal     Hepatic/GI:  Hepatic/GI Normal    Neurological:   Neuromuscular Disease, CERVICAL RADICULOPATHY   Endocrine:  Endocrine Normal      Lab Results   Component Value Date    WBC 6.03 03/24/2023    HGB 9.7 (L) 03/24/2023    HCT 30.2 (L) 03/24/2023    MCV 89 03/24/2023     03/24/2023     CMP  Sodium   Date Value Ref Range Status   04/18/2023 140 136 - 145 mmol/L Final     Potassium   Date Value Ref Range Status   04/18/2023 3.8 3.5 - 5.1 mmol/L Final     Chloride   Date Value Ref Range Status   04/18/2023 107 95 - 110 mmol/L Final     CO2   Date Value Ref Range Status   04/18/2023 28 23 - 29 mmol/L Final     Glucose   Date Value Ref Range Status   04/18/2023 99 70 - 110 mg/dL Final     BUN   Date Value Ref Range Status   04/18/2023 14 6 - 20 mg/dL Final     Creatinine   Date Value Ref Range Status   04/18/2023 0.9 0.5 - 1.4 mg/dL Final     Calcium   Date Value Ref Range Status   04/18/2023 9.3 8.7 - 10.5 mg/dL Final     Total Protein   Date Value Ref Range Status   04/18/2023 7.0 6.0 - 8.4 g/dL Final     Albumin   Date Value Ref Range Status   04/18/2023 3.4 (L) 3.5 - 5.2 g/dL Final     Total Bilirubin   Date Value Ref Range Status   04/18/2023 0.2 0.1 - 1.0 mg/dL Final     Comment:     For infants and newborns, interpretation of results should be based  on gestational age, weight and in  agreement with clinical  observations.    Premature Infant recommended reference ranges:  Up to 24 hours.............<8.0 mg/dL  Up to 48 hours............<12.0 mg/dL  3-5 days..................<15.0 mg/dL  6-29 days.................<15.0 mg/dL    For patients on Eltrombopag therapy, use of Dimension [place] TBIL is   not   recommended.       Alkaline Phosphatase   Date Value Ref Range Status   04/18/2023 94 55 - 135 U/L Final     AST   Date Value Ref Range Status   04/18/2023 12 10 - 40 U/L Final     ALT   Date Value Ref Range Status   04/18/2023 16 10 - 44 U/L Final     Anion Gap   Date Value Ref Range Status   04/18/2023 5 (L) 8 - 16 mmol/L Final     eGFR   Date Value Ref Range Status   04/18/2023 >60.0 >60 mL/min/1.73 m^2 Final       Physical Exam  General: Well nourished    Airway:  Mallampati: I / I  Mouth Opening: Normal  TM Distance: Normal  Tongue: Normal  Neck ROM: Normal ROM    Dental:  Intact    Chest/Lungs:  Clear to auscultation    Heart:  Rate: Normal  Rhythm: Regular Rhythm  Sounds: Normal        Anesthesia Plan  Type of Anesthesia, risks & benefits discussed:    Anesthesia Type: Gen ETT  Intra-op Monitoring Plan: Standard ASA Monitors  Post Op Pain Control Plan: multimodal analgesia  Induction:  IV  Airway Plan: Direct  Informed Consent: Informed consent signed with the Patient and all parties understand the risks and agree with anesthesia plan.  All questions answered.   ASA Score: 2  Day of Surgery Review of History & Physical: I have interviewed and examined the patient. I have reviewed the patient's H&P dated: There are no significant changes.     Ready For Surgery From Anesthesia Perspective.     .

## 2023-04-25 ENCOUNTER — NURSE TRIAGE (OUTPATIENT)
Dept: ADMINISTRATIVE | Facility: CLINIC | Age: 53
End: 2023-04-25
Payer: MEDICAID

## 2023-04-25 RX ORDER — NEOMYCIN SULFATE 500 MG/1
1000 TABLET ORAL 2 TIMES DAILY
Qty: 4 TABLET | Refills: 0 | Status: ON HOLD | OUTPATIENT
Start: 2023-04-25 | End: 2023-04-26

## 2023-04-25 RX ORDER — METRONIDAZOLE 500 MG/1
1000 TABLET ORAL EVERY 12 HOURS
Qty: 4 TABLET | Refills: 0 | Status: ON HOLD | OUTPATIENT
Start: 2023-04-25 | End: 2023-04-26

## 2023-04-25 RX ORDER — SODIUM, POTASSIUM,MAG SULFATES 17.5-3.13G
1 SOLUTION, RECONSTITUTED, ORAL ORAL 2 TIMES DAILY
Qty: 1 KIT | Refills: 0 | Status: ON HOLD | OUTPATIENT
Start: 2023-04-25 | End: 2023-04-26

## 2023-04-25 NOTE — TELEPHONE ENCOUNTER
Caridad states she is scheduled for colonoscopy & possible surgery tomorrow. Prescribed Suprep. Drank 1 bottle of prep and vomited within 15 mins of drinking prep. Unsure if she can tolerate second part of prep at 10pm tonight. Advised per triage protocol that on call provider will be contacted. Informred caller of brief hold. V/u.     Spoke to Jeannine Mendez regarding pt call. Per Dr. Paez's verbal advice, tell pt to get one 28 oz bottle of Gatorade one 8 oz of bottle of Miralax. Mix half (approx 4 oz) of 8 oz bottle of Miralax with Gatorade. Start sipping at 0800 & try to finish it by midnight.     Updated Caridad per Dr. Paez's verbal advice. Instructed to call back if further questions. V/u.  Reason for Disposition   [1] Abdominal bloating, cramping, nausea, or vomiting while drinking bowel prep AND [2] CANNOT finish bowel prep AND [3] has tried recommended Care Advice    Additional Information   Negative: Shock suspected (e.g., cold/pale/clammy skin, too weak to stand, low BP, rapid pulse)   Negative: Difficult to awaken or acting confused (e.g., disoriented, slurred speech)   Negative: Passed out (i.e., lost consciousness, collapsed and was not responding)   Negative: Sounds like a life-threatening emergency to the triager   Negative: SEVERE abdomen pain (e.g., excruciating)   Negative: SEVERE rectal bleeding (large blood clots; on and off, or constant bleeding)   Negative: SEVERE dizziness (e.g., unable to stand, requires support to walk, feels like passing out now)   Negative: SEVERE vomiting (e.g., 6 or more times/day)   Negative: [1] MODERATE rectal bleeding (small blood clots, passing blood without stool, or toilet water turns red) AND [2] more than once   Negative: [1] MILD-MODERATE abdomen pain AND [2] constant AND [3] present > 2 hours   Negative: [1] Drinking very little AND [2] dehydration suspected (e.g., no urine > 12 hours, very dry mouth, very lightheaded)   Negative: Patient sounds very  sick or weak to the triager   Negative: Fever > 100.4 F (38.0 C)    Protocols used: Colonoscopy Symptoms and Qihzmcsog-S-WN

## 2023-04-26 ENCOUNTER — ANESTHESIA (OUTPATIENT)
Dept: SURGERY | Facility: HOSPITAL | Age: 53
DRG: 331 | End: 2023-04-26
Payer: MEDICAID

## 2023-04-26 ENCOUNTER — HOSPITAL ENCOUNTER (INPATIENT)
Facility: HOSPITAL | Age: 53
LOS: 5 days | Discharge: HOME | DRG: 331 | End: 2023-05-01
Attending: STUDENT IN AN ORGANIZED HEALTH CARE EDUCATION/TRAINING PROGRAM | Admitting: STUDENT IN AN ORGANIZED HEALTH CARE EDUCATION/TRAINING PROGRAM
Payer: MEDICAID

## 2023-04-26 DIAGNOSIS — K56.600 PARTIAL OBSTRUCTION OF COLON: ICD-10-CM

## 2023-04-26 DIAGNOSIS — K63.89 COLONIC MASS: Primary | ICD-10-CM

## 2023-04-26 LAB — B-HCG UR QL: NEGATIVE

## 2023-04-26 PROCEDURE — 63600175 PHARM REV CODE 636 W HCPCS: Performed by: STUDENT IN AN ORGANIZED HEALTH CARE EDUCATION/TRAINING PROGRAM

## 2023-04-26 PROCEDURE — 27000221 HC OXYGEN, UP TO 24 HOURS

## 2023-04-26 PROCEDURE — 25000003 PHARM REV CODE 250: Performed by: STUDENT IN AN ORGANIZED HEALTH CARE EDUCATION/TRAINING PROGRAM

## 2023-04-26 PROCEDURE — 63600175 PHARM REV CODE 636 W HCPCS: Performed by: NURSE ANESTHETIST, CERTIFIED REGISTERED

## 2023-04-26 PROCEDURE — 25000003 PHARM REV CODE 250: Performed by: NURSE ANESTHETIST, CERTIFIED REGISTERED

## 2023-04-26 PROCEDURE — 44140 PR PART REMOVAL COLON W ANASTOMOSIS: ICD-10-PCS | Mod: ,,, | Performed by: STUDENT IN AN ORGANIZED HEALTH CARE EDUCATION/TRAINING PROGRAM

## 2023-04-26 PROCEDURE — 99900031 HC PATIENT EDUCATION (STAT)

## 2023-04-26 PROCEDURE — 37000009 HC ANESTHESIA EA ADD 15 MINS: Performed by: STUDENT IN AN ORGANIZED HEALTH CARE EDUCATION/TRAINING PROGRAM

## 2023-04-26 PROCEDURE — 27201423 OPTIME MED/SURG SUP & DEVICES STERILE SUPPLY: Performed by: STUDENT IN AN ORGANIZED HEALTH CARE EDUCATION/TRAINING PROGRAM

## 2023-04-26 PROCEDURE — 71000033 HC RECOVERY, INTIAL HOUR: Performed by: STUDENT IN AN ORGANIZED HEALTH CARE EDUCATION/TRAINING PROGRAM

## 2023-04-26 PROCEDURE — 63600175 PHARM REV CODE 636 W HCPCS: Performed by: ANESTHESIOLOGY

## 2023-04-26 PROCEDURE — 44140 PARTIAL REMOVAL OF COLON: CPT | Mod: ,,, | Performed by: STUDENT IN AN ORGANIZED HEALTH CARE EDUCATION/TRAINING PROGRAM

## 2023-04-26 PROCEDURE — 11000001 HC ACUTE MED/SURG PRIVATE ROOM

## 2023-04-26 PROCEDURE — 36000710: Performed by: STUDENT IN AN ORGANIZED HEALTH CARE EDUCATION/TRAINING PROGRAM

## 2023-04-26 PROCEDURE — 37000008 HC ANESTHESIA 1ST 15 MINUTES: Performed by: STUDENT IN AN ORGANIZED HEALTH CARE EDUCATION/TRAINING PROGRAM

## 2023-04-26 PROCEDURE — 99900035 HC TECH TIME PER 15 MIN (STAT)

## 2023-04-26 PROCEDURE — 81025 URINE PREGNANCY TEST: CPT | Performed by: STUDENT IN AN ORGANIZED HEALTH CARE EDUCATION/TRAINING PROGRAM

## 2023-04-26 PROCEDURE — 25000003 PHARM REV CODE 250: Performed by: ANESTHESIOLOGY

## 2023-04-26 PROCEDURE — 36000711: Performed by: STUDENT IN AN ORGANIZED HEALTH CARE EDUCATION/TRAINING PROGRAM

## 2023-04-26 RX ORDER — METOCLOPRAMIDE HYDROCHLORIDE 5 MG/ML
5 INJECTION INTRAMUSCULAR; INTRAVENOUS EVERY 6 HOURS PRN
Status: DISCONTINUED | OUTPATIENT
Start: 2023-04-26 | End: 2023-04-26 | Stop reason: HOSPADM

## 2023-04-26 RX ORDER — BUPIVACAINE HYDROCHLORIDE AND EPINEPHRINE 5; 5 MG/ML; UG/ML
INJECTION, SOLUTION EPIDURAL; INTRACAUDAL; PERINEURAL
Status: DISCONTINUED | OUTPATIENT
Start: 2023-04-26 | End: 2023-04-26 | Stop reason: HOSPADM

## 2023-04-26 RX ORDER — DIPHENHYDRAMINE HYDROCHLORIDE 50 MG/ML
25 INJECTION INTRAMUSCULAR; INTRAVENOUS EVERY 6 HOURS PRN
Status: DISCONTINUED | OUTPATIENT
Start: 2023-04-26 | End: 2023-04-26 | Stop reason: HOSPADM

## 2023-04-26 RX ORDER — METHOCARBAMOL 100 MG/ML
1000 INJECTION, SOLUTION INTRAMUSCULAR; INTRAVENOUS EVERY 8 HOURS
Status: COMPLETED | OUTPATIENT
Start: 2023-04-26 | End: 2023-04-27

## 2023-04-26 RX ORDER — MEPERIDINE HYDROCHLORIDE 25 MG/ML
25 INJECTION INTRAMUSCULAR; INTRAVENOUS; SUBCUTANEOUS EVERY 10 MIN PRN
Status: DISCONTINUED | OUTPATIENT
Start: 2023-04-26 | End: 2023-04-26 | Stop reason: HOSPADM

## 2023-04-26 RX ORDER — ROCURONIUM BROMIDE 10 MG/ML
INJECTION, SOLUTION INTRAVENOUS
Status: DISCONTINUED | OUTPATIENT
Start: 2023-04-26 | End: 2023-04-26

## 2023-04-26 RX ORDER — ONDANSETRON 2 MG/ML
4 INJECTION INTRAMUSCULAR; INTRAVENOUS EVERY 12 HOURS PRN
Status: DISCONTINUED | OUTPATIENT
Start: 2023-04-26 | End: 2023-05-01 | Stop reason: HOSPADM

## 2023-04-26 RX ORDER — OXYBUTYNIN CHLORIDE 5 MG/1
5 TABLET ORAL 3 TIMES DAILY
Status: DISCONTINUED | OUTPATIENT
Start: 2023-04-26 | End: 2023-04-27

## 2023-04-26 RX ORDER — ACETAMINOPHEN 10 MG/ML
INJECTION, SOLUTION INTRAVENOUS
Status: DISCONTINUED | OUTPATIENT
Start: 2023-04-26 | End: 2023-04-26

## 2023-04-26 RX ORDER — ONDANSETRON 2 MG/ML
4 INJECTION INTRAMUSCULAR; INTRAVENOUS EVERY 12 HOURS PRN
Status: DISCONTINUED | OUTPATIENT
Start: 2023-04-26 | End: 2023-04-26 | Stop reason: HOSPADM

## 2023-04-26 RX ORDER — MORPHINE SULFATE 10 MG/ML
INJECTION, SOLUTION INTRAMUSCULAR; INTRAVENOUS
Status: DISCONTINUED | OUTPATIENT
Start: 2023-04-26 | End: 2023-04-26

## 2023-04-26 RX ORDER — METOCLOPRAMIDE HYDROCHLORIDE 5 MG/ML
5 INJECTION INTRAMUSCULAR; INTRAVENOUS EVERY 6 HOURS PRN
Status: DISCONTINUED | OUTPATIENT
Start: 2023-04-26 | End: 2023-05-01 | Stop reason: HOSPADM

## 2023-04-26 RX ORDER — MORPHINE SULFATE 4 MG/ML
4 INJECTION, SOLUTION INTRAMUSCULAR; INTRAVENOUS EVERY 4 HOURS PRN
Status: DISCONTINUED | OUTPATIENT
Start: 2023-04-26 | End: 2023-04-27

## 2023-04-26 RX ORDER — SODIUM CHLORIDE 0.9 % (FLUSH) 0.9 %
10 SYRINGE (ML) INJECTION
Status: DISCONTINUED | OUTPATIENT
Start: 2023-04-26 | End: 2023-05-01 | Stop reason: HOSPADM

## 2023-04-26 RX ORDER — FINASTERIDE 5 MG/1
5 TABLET, FILM COATED ORAL DAILY
Status: DISCONTINUED | OUTPATIENT
Start: 2023-04-27 | End: 2023-05-01 | Stop reason: HOSPADM

## 2023-04-26 RX ORDER — VALACYCLOVIR HYDROCHLORIDE 500 MG/1
500 TABLET, FILM COATED ORAL DAILY
Status: DISCONTINUED | OUTPATIENT
Start: 2023-04-27 | End: 2023-05-01 | Stop reason: HOSPADM

## 2023-04-26 RX ORDER — MIDAZOLAM HYDROCHLORIDE 1 MG/ML
INJECTION INTRAMUSCULAR; INTRAVENOUS
Status: DISCONTINUED | OUTPATIENT
Start: 2023-04-26 | End: 2023-04-26

## 2023-04-26 RX ORDER — KETOROLAC TROMETHAMINE 30 MG/ML
30 INJECTION, SOLUTION INTRAMUSCULAR; INTRAVENOUS EVERY 6 HOURS
Status: COMPLETED | OUTPATIENT
Start: 2023-04-26 | End: 2023-04-29

## 2023-04-26 RX ORDER — LIDOCAINE HYDROCHLORIDE 10 MG/ML
1 INJECTION, SOLUTION EPIDURAL; INFILTRATION; INTRACAUDAL; PERINEURAL ONCE AS NEEDED
Status: DISCONTINUED | OUTPATIENT
Start: 2023-04-26 | End: 2023-05-01 | Stop reason: HOSPADM

## 2023-04-26 RX ORDER — ONDANSETRON 2 MG/ML
4 INJECTION INTRAMUSCULAR; INTRAVENOUS DAILY PRN
Status: DISCONTINUED | OUTPATIENT
Start: 2023-04-26 | End: 2023-04-26 | Stop reason: HOSPADM

## 2023-04-26 RX ORDER — ACETAMINOPHEN 325 MG/1
650 TABLET ORAL EVERY 8 HOURS PRN
Status: DISCONTINUED | OUTPATIENT
Start: 2023-04-26 | End: 2023-04-27

## 2023-04-26 RX ORDER — SODIUM CHLORIDE, SODIUM LACTATE, POTASSIUM CHLORIDE, CALCIUM CHLORIDE 600; 310; 30; 20 MG/100ML; MG/100ML; MG/100ML; MG/100ML
INJECTION, SOLUTION INTRAVENOUS CONTINUOUS
Status: DISCONTINUED | OUTPATIENT
Start: 2023-04-26 | End: 2023-04-29

## 2023-04-26 RX ORDER — LIDOCAINE HYDROCHLORIDE 10 MG/ML
INJECTION, SOLUTION INTRAVENOUS
Status: DISCONTINUED | OUTPATIENT
Start: 2023-04-26 | End: 2023-04-26

## 2023-04-26 RX ORDER — SODIUM CHLORIDE 9 MG/ML
INJECTION, SOLUTION INTRAVENOUS CONTINUOUS
Status: DISCONTINUED | OUTPATIENT
Start: 2023-04-26 | End: 2023-04-26

## 2023-04-26 RX ORDER — NEOSTIGMINE METHYLSULFATE 5 MG/5 ML
SYRINGE (ML) INTRAVENOUS
Status: DISCONTINUED | OUTPATIENT
Start: 2023-04-26 | End: 2023-04-26

## 2023-04-26 RX ORDER — PROPOFOL 10 MG/ML
VIAL (ML) INTRAVENOUS
Status: DISCONTINUED | OUTPATIENT
Start: 2023-04-26 | End: 2023-04-26

## 2023-04-26 RX ORDER — TALC
6 POWDER (GRAM) TOPICAL NIGHTLY PRN
Status: DISCONTINUED | OUTPATIENT
Start: 2023-04-26 | End: 2023-05-01 | Stop reason: HOSPADM

## 2023-04-26 RX ORDER — ONDANSETRON HYDROCHLORIDE 2 MG/ML
INJECTION, SOLUTION INTRAMUSCULAR; INTRAVENOUS
Status: DISCONTINUED | OUTPATIENT
Start: 2023-04-26 | End: 2023-04-26

## 2023-04-26 RX ORDER — FENTANYL CITRATE 50 UG/ML
INJECTION, SOLUTION INTRAMUSCULAR; INTRAVENOUS
Status: DISCONTINUED | OUTPATIENT
Start: 2023-04-26 | End: 2023-04-26

## 2023-04-26 RX ORDER — GLYCOPYRROLATE 0.2 MG/ML
INJECTION, SOLUTION INTRAMUSCULAR; INTRAVENOUS
Status: DISCONTINUED | OUTPATIENT
Start: 2023-04-26 | End: 2023-04-26

## 2023-04-26 RX ORDER — EPHEDRINE SULFATE 50 MG/ML
INJECTION, SOLUTION INTRAVENOUS
Status: DISCONTINUED | OUTPATIENT
Start: 2023-04-26 | End: 2023-04-26

## 2023-04-26 RX ADMIN — FENTANYL CITRATE 50 MCG: 50 INJECTION, SOLUTION INTRAMUSCULAR; INTRAVENOUS at 10:04

## 2023-04-26 RX ADMIN — MORPHINE SULFATE 4 MG: 4 INJECTION INTRAVENOUS at 07:04

## 2023-04-26 RX ADMIN — EPHEDRINE SULFATE 25 MG: 50 INJECTION, SOLUTION INTRAVENOUS at 11:04

## 2023-04-26 RX ADMIN — SODIUM CHLORIDE: 9 INJECTION, SOLUTION INTRAVENOUS at 09:04

## 2023-04-26 RX ADMIN — EPHEDRINE SULFATE 25 MG: 50 INJECTION, SOLUTION INTRAVENOUS at 10:04

## 2023-04-26 RX ADMIN — FENTANYL CITRATE 50 MCG: 50 INJECTION, SOLUTION INTRAMUSCULAR; INTRAVENOUS at 09:04

## 2023-04-26 RX ADMIN — Medication 5 MG: at 01:04

## 2023-04-26 RX ADMIN — SODIUM CHLORIDE: 9 INJECTION, SOLUTION INTRAVENOUS at 02:04

## 2023-04-26 RX ADMIN — FENTANYL CITRATE 50 MCG: 50 INJECTION, SOLUTION INTRAMUSCULAR; INTRAVENOUS at 11:04

## 2023-04-26 RX ADMIN — FENTANYL CITRATE 50 MCG: 50 INJECTION, SOLUTION INTRAMUSCULAR; INTRAVENOUS at 12:04

## 2023-04-26 RX ADMIN — KETOROLAC TROMETHAMINE 30 MG: 30 INJECTION, SOLUTION INTRAMUSCULAR; INTRAVENOUS at 06:04

## 2023-04-26 RX ADMIN — MEPERIDINE HYDROCHLORIDE 25 MG: 25 INJECTION, SOLUTION INTRAMUSCULAR; INTRAVENOUS; SUBCUTANEOUS at 01:04

## 2023-04-26 RX ADMIN — ROCURONIUM BROMIDE 20 MG: 10 INJECTION, SOLUTION INTRAVENOUS at 11:04

## 2023-04-26 RX ADMIN — METHOCARBAMOL 1000 MG: 100 INJECTION INTRAMUSCULAR; INTRAVENOUS at 03:04

## 2023-04-26 RX ADMIN — SODIUM CHLORIDE, POTASSIUM CHLORIDE, SODIUM LACTATE AND CALCIUM CHLORIDE: 600; 310; 30; 20 INJECTION, SOLUTION INTRAVENOUS at 03:04

## 2023-04-26 RX ADMIN — GLYCOPYRROLATE 0.6 MG: 0.2 INJECTION, SOLUTION INTRAMUSCULAR; INTRAVENOUS at 01:04

## 2023-04-26 RX ADMIN — MORPHINE SULFATE 5 MG: 10 INJECTION, SOLUTION INTRAMUSCULAR; INTRAVENOUS at 01:04

## 2023-04-26 RX ADMIN — SODIUM CHLORIDE, POTASSIUM CHLORIDE, SODIUM LACTATE AND CALCIUM CHLORIDE: 600; 310; 30; 20 INJECTION, SOLUTION INTRAVENOUS at 10:04

## 2023-04-26 RX ADMIN — OXYBUTYNIN CHLORIDE 5 MG: 5 TABLET ORAL at 10:04

## 2023-04-26 RX ADMIN — SODIUM CHLORIDE: 9 INJECTION, SOLUTION INTRAVENOUS at 12:04

## 2023-04-26 RX ADMIN — ONDANSETRON HYDROCHLORIDE 4 MG: 2 SOLUTION INTRAMUSCULAR; INTRAVENOUS at 04:04

## 2023-04-26 RX ADMIN — ROCURONIUM BROMIDE 20 MG: 10 INJECTION, SOLUTION INTRAVENOUS at 10:04

## 2023-04-26 RX ADMIN — ACETAMINOPHEN 1000 MG: 10 INJECTION, SOLUTION INTRAVENOUS at 12:04

## 2023-04-26 RX ADMIN — SODIUM CHLORIDE: 9 INJECTION, SOLUTION INTRAVENOUS at 06:04

## 2023-04-26 RX ADMIN — FENTANYL CITRATE 100 MCG: 50 INJECTION, SOLUTION INTRAMUSCULAR; INTRAVENOUS at 08:04

## 2023-04-26 RX ADMIN — Medication 150 MG: at 08:04

## 2023-04-26 RX ADMIN — ONDANSETRON 4 MG: 2 INJECTION INTRAMUSCULAR; INTRAVENOUS at 08:04

## 2023-04-26 RX ADMIN — EPHEDRINE SULFATE 25 MG: 50 INJECTION, SOLUTION INTRAVENOUS at 12:04

## 2023-04-26 RX ADMIN — CEFOXITIN 2 G: 2 INJECTION, POWDER, FOR SOLUTION INTRAVENOUS at 08:04

## 2023-04-26 RX ADMIN — ROCURONIUM BROMIDE 20 MG: 10 INJECTION, SOLUTION INTRAVENOUS at 12:04

## 2023-04-26 RX ADMIN — MIDAZOLAM 2 MG: 1 INJECTION INTRAMUSCULAR; INTRAVENOUS at 08:04

## 2023-04-26 RX ADMIN — ROCURONIUM BROMIDE 40 MG: 10 INJECTION, SOLUTION INTRAVENOUS at 08:04

## 2023-04-26 RX ADMIN — METHOCARBAMOL 1000 MG: 100 INJECTION INTRAMUSCULAR; INTRAVENOUS at 10:04

## 2023-04-26 RX ADMIN — METOCLOPRAMIDE 5 MG: 5 INJECTION, SOLUTION INTRAMUSCULAR; INTRAVENOUS at 07:04

## 2023-04-26 RX ADMIN — LIDOCAINE HYDROCHLORIDE 50 MG: 10 INJECTION, SOLUTION INTRAVENOUS at 08:04

## 2023-04-26 NOTE — ANESTHESIA PROCEDURE NOTES
Intubation    Date/Time: 4/26/2023 8:40 AM  Performed by: Jayme Eid CRNA  Authorized by: Jayme Eid CRNA     Intubation:     Induction:  Intravenous    Intubated:  Postinduction    Mask Ventilation:  Easy mask    Attempts:  1    Attempted By:  CRNA    Method of Intubation:  Direct    Blade:  Ellis 2    Laryngeal View Grade: Grade I - full view of cords      Difficult Airway Encountered?: No      Complications:  None    Airway Device:  Oral endotracheal tube    Airway Device Size:  7.0    Style/Cuff Inflation:  Cuffed    Inflation Amount (mL):  5    Tube secured:  22    Secured at:  The lips    Placement Verified By:  Capnometry    Complicating Factors:  None    Findings Post-Intubation:  BS equal bilateral and atraumatic/condition of teeth unchanged

## 2023-04-26 NOTE — TRANSFER OF CARE
Anesthesia Transfer of Care Note    Patient: Caridad Hope    Procedure(s) Performed: Procedure(s) (LRB):  HEMICOLECTOMY, LAPAROSCOPIC, left (Left)  COLONOSCOPY WITH ENDOSCOPIC TATTOOING (N/A)    Patient location: PACU    Anesthesia Type: general    Transport from OR: Transported from OR on room air with adequate spontaneous ventilation    Post pain: adequate analgesia    Post assessment: no apparent anesthetic complications    Post vital signs: stable    Level of consciousness: awake    Nausea/Vomiting: no nausea/vomiting    Complications: none    Transfer of care protocol was followed      Last vitals:   115/57  16 RR  37 C TEMP  100 HR  99% O2 SAT

## 2023-04-26 NOTE — INTERVAL H&P NOTE
Patient seen and examined.  No interval change since the below obtained H&P  Informed consent verified and surgical site marked  NPO since midnight  All questions and concerns addressed  To OR for lap vs open left hemicolectomy with colonoscopy     Yarelis Paez MD  General Surgery   [phone]

## 2023-04-27 LAB
ALBUMIN SERPL BCP-MCNC: 2.5 G/DL (ref 3.5–5.2)
ALP SERPL-CCNC: 54 U/L (ref 55–135)
ALT SERPL W/O P-5'-P-CCNC: 14 U/L (ref 10–44)
ANION GAP SERPL CALC-SCNC: 1 MMOL/L (ref 8–16)
AST SERPL-CCNC: 11 U/L (ref 10–40)
BILIRUB SERPL-MCNC: 0.3 MG/DL (ref 0.1–1)
BUN SERPL-MCNC: 6 MG/DL (ref 6–20)
CALCIUM SERPL-MCNC: 7.8 MG/DL (ref 8.7–10.5)
CEA SERPL-MCNC: 2.8 NG/ML (ref 0–5)
CHLORIDE SERPL-SCNC: 115 MMOL/L (ref 95–110)
CO2 SERPL-SCNC: 26 MMOL/L (ref 23–29)
CREAT SERPL-MCNC: 0.6 MG/DL (ref 0.5–1.4)
ERYTHROCYTE [DISTWIDTH] IN BLOOD BY AUTOMATED COUNT: 13.5 % (ref 11.5–14.5)
EST. GFR  (NO RACE VARIABLE): >60 ML/MIN/1.73 M^2
GLUCOSE SERPL-MCNC: 108 MG/DL (ref 70–110)
HCT VFR BLD AUTO: 28.8 % (ref 37–48.5)
HGB BLD-MCNC: 9.2 G/DL (ref 12–16)
MAGNESIUM SERPL-MCNC: 1.7 MG/DL (ref 1.6–2.6)
MCH RBC QN AUTO: 27.6 PG (ref 27–31)
MCHC RBC AUTO-ENTMCNC: 31.9 G/DL (ref 32–36)
MCV RBC AUTO: 87 FL (ref 82–98)
PHOSPHATE SERPL-MCNC: 2.4 MG/DL (ref 2.7–4.5)
PLATELET # BLD AUTO: 274 K/UL (ref 150–450)
PMV BLD AUTO: 9.3 FL (ref 9.2–12.9)
POTASSIUM SERPL-SCNC: 3.7 MMOL/L (ref 3.5–5.1)
PROT SERPL-MCNC: 5.1 G/DL (ref 6–8.4)
RBC # BLD AUTO: 3.33 M/UL (ref 4–5.4)
SODIUM SERPL-SCNC: 142 MMOL/L (ref 136–145)
WBC # BLD AUTO: 15.95 K/UL (ref 3.9–12.7)

## 2023-04-27 PROCEDURE — 99024 POSTOP FOLLOW-UP VISIT: CPT | Mod: ,,, | Performed by: STUDENT IN AN ORGANIZED HEALTH CARE EDUCATION/TRAINING PROGRAM

## 2023-04-27 PROCEDURE — 82378 CARCINOEMBRYONIC ANTIGEN: CPT | Performed by: STUDENT IN AN ORGANIZED HEALTH CARE EDUCATION/TRAINING PROGRAM

## 2023-04-27 PROCEDURE — 99900035 HC TECH TIME PER 15 MIN (STAT)

## 2023-04-27 PROCEDURE — 27000221 HC OXYGEN, UP TO 24 HOURS

## 2023-04-27 PROCEDURE — 80053 COMPREHEN METABOLIC PANEL: CPT | Performed by: STUDENT IN AN ORGANIZED HEALTH CARE EDUCATION/TRAINING PROGRAM

## 2023-04-27 PROCEDURE — 94799 UNLISTED PULMONARY SVC/PX: CPT

## 2023-04-27 PROCEDURE — 94761 N-INVAS EAR/PLS OXIMETRY MLT: CPT

## 2023-04-27 PROCEDURE — 25000003 PHARM REV CODE 250: Performed by: STUDENT IN AN ORGANIZED HEALTH CARE EDUCATION/TRAINING PROGRAM

## 2023-04-27 PROCEDURE — 63600175 PHARM REV CODE 636 W HCPCS: Performed by: STUDENT IN AN ORGANIZED HEALTH CARE EDUCATION/TRAINING PROGRAM

## 2023-04-27 PROCEDURE — 99900031 HC PATIENT EDUCATION (STAT)

## 2023-04-27 PROCEDURE — 83735 ASSAY OF MAGNESIUM: CPT | Performed by: STUDENT IN AN ORGANIZED HEALTH CARE EDUCATION/TRAINING PROGRAM

## 2023-04-27 PROCEDURE — 36415 COLL VENOUS BLD VENIPUNCTURE: CPT | Performed by: STUDENT IN AN ORGANIZED HEALTH CARE EDUCATION/TRAINING PROGRAM

## 2023-04-27 PROCEDURE — 11000001 HC ACUTE MED/SURG PRIVATE ROOM

## 2023-04-27 PROCEDURE — 99024 PR POST-OP FOLLOW-UP VISIT: ICD-10-PCS | Mod: ,,, | Performed by: STUDENT IN AN ORGANIZED HEALTH CARE EDUCATION/TRAINING PROGRAM

## 2023-04-27 PROCEDURE — 84100 ASSAY OF PHOSPHORUS: CPT | Performed by: STUDENT IN AN ORGANIZED HEALTH CARE EDUCATION/TRAINING PROGRAM

## 2023-04-27 PROCEDURE — 85027 COMPLETE CBC AUTOMATED: CPT | Performed by: STUDENT IN AN ORGANIZED HEALTH CARE EDUCATION/TRAINING PROGRAM

## 2023-04-27 RX ORDER — HYDROMORPHONE HYDROCHLORIDE 1 MG/ML
0.5 INJECTION, SOLUTION INTRAMUSCULAR; INTRAVENOUS; SUBCUTANEOUS ONCE
Status: DISCONTINUED | OUTPATIENT
Start: 2023-04-27 | End: 2023-04-27

## 2023-04-27 RX ORDER — MORPHINE SULFATE 4 MG/ML
4 INJECTION, SOLUTION INTRAMUSCULAR; INTRAVENOUS EVERY 4 HOURS PRN
Status: DISCONTINUED | OUTPATIENT
Start: 2023-04-27 | End: 2023-05-01 | Stop reason: HOSPADM

## 2023-04-27 RX ORDER — METHOCARBAMOL 500 MG/1
1000 TABLET, FILM COATED ORAL 4 TIMES DAILY
Status: DISCONTINUED | OUTPATIENT
Start: 2023-04-27 | End: 2023-05-01 | Stop reason: HOSPADM

## 2023-04-27 RX ORDER — HYDROCODONE BITARTRATE AND ACETAMINOPHEN 10; 325 MG/1; MG/1
1 TABLET ORAL EVERY 4 HOURS PRN
Status: DISCONTINUED | OUTPATIENT
Start: 2023-04-27 | End: 2023-05-01 | Stop reason: HOSPADM

## 2023-04-27 RX ORDER — OXYBUTYNIN CHLORIDE 5 MG/1
10 TABLET ORAL DAILY
Status: DISCONTINUED | OUTPATIENT
Start: 2023-04-28 | End: 2023-05-01 | Stop reason: HOSPADM

## 2023-04-27 RX ORDER — ENOXAPARIN SODIUM 100 MG/ML
40 INJECTION SUBCUTANEOUS EVERY 24 HOURS
Status: DISCONTINUED | OUTPATIENT
Start: 2023-04-27 | End: 2023-05-01 | Stop reason: HOSPADM

## 2023-04-27 RX ORDER — MUPIROCIN 20 MG/G
OINTMENT TOPICAL 2 TIMES DAILY
Status: DISCONTINUED | OUTPATIENT
Start: 2023-04-27 | End: 2023-05-01 | Stop reason: HOSPADM

## 2023-04-27 RX ORDER — ACETAMINOPHEN 10 MG/ML
1000 INJECTION, SOLUTION INTRAVENOUS EVERY 8 HOURS
Status: COMPLETED | OUTPATIENT
Start: 2023-04-27 | End: 2023-04-28

## 2023-04-27 RX ADMIN — METHOCARBAMOL 1000 MG: 500 TABLET ORAL at 08:04

## 2023-04-27 RX ADMIN — MUPIROCIN: 20 OINTMENT TOPICAL at 09:04

## 2023-04-27 RX ADMIN — OXYBUTYNIN CHLORIDE 5 MG: 5 TABLET ORAL at 08:04

## 2023-04-27 RX ADMIN — ACETAMINOPHEN 1000 MG: 10 INJECTION INTRAVENOUS at 01:04

## 2023-04-27 RX ADMIN — HYDROCODONE BITARTRATE AND ACETAMINOPHEN 1 TABLET: 10; 325 TABLET ORAL at 09:04

## 2023-04-27 RX ADMIN — KETOROLAC TROMETHAMINE 30 MG: 30 INJECTION, SOLUTION INTRAMUSCULAR; INTRAVENOUS at 11:04

## 2023-04-27 RX ADMIN — HYDROCODONE BITARTRATE AND ACETAMINOPHEN 1 TABLET: 10; 325 TABLET ORAL at 04:04

## 2023-04-27 RX ADMIN — METHOCARBAMOL 1000 MG: 100 INJECTION INTRAMUSCULAR; INTRAVENOUS at 06:04

## 2023-04-27 RX ADMIN — ACETAMINOPHEN 1000 MG: 10 INJECTION INTRAVENOUS at 10:04

## 2023-04-27 RX ADMIN — ENOXAPARIN SODIUM 40 MG: 40 INJECTION SUBCUTANEOUS at 04:04

## 2023-04-27 RX ADMIN — SODIUM CHLORIDE, POTASSIUM CHLORIDE, SODIUM LACTATE AND CALCIUM CHLORIDE: 600; 310; 30; 20 INJECTION, SOLUTION INTRAVENOUS at 10:04

## 2023-04-27 RX ADMIN — METHOCARBAMOL 1000 MG: 500 TABLET ORAL at 04:04

## 2023-04-27 RX ADMIN — VALACYCLOVIR HYDROCHLORIDE 500 MG: 500 TABLET, FILM COATED ORAL at 08:04

## 2023-04-27 RX ADMIN — FINASTERIDE 5 MG: 5 TABLET, FILM COATED ORAL at 08:04

## 2023-04-27 RX ADMIN — MORPHINE SULFATE 4 MG: 4 INJECTION INTRAVENOUS at 04:04

## 2023-04-27 RX ADMIN — KETOROLAC TROMETHAMINE 30 MG: 30 INJECTION, SOLUTION INTRAMUSCULAR; INTRAVENOUS at 05:04

## 2023-04-27 RX ADMIN — METHOCARBAMOL 1000 MG: 500 TABLET ORAL at 12:04

## 2023-04-27 RX ADMIN — SODIUM CHLORIDE, POTASSIUM CHLORIDE, SODIUM LACTATE AND CALCIUM CHLORIDE: 600; 310; 30; 20 INJECTION, SOLUTION INTRAVENOUS at 06:04

## 2023-04-27 RX ADMIN — Medication 6 MG: at 08:04

## 2023-04-27 RX ADMIN — METHOCARBAMOL 1000 MG: 500 TABLET ORAL at 09:04

## 2023-04-27 RX ADMIN — KETOROLAC TROMETHAMINE 30 MG: 30 INJECTION, SOLUTION INTRAMUSCULAR; INTRAVENOUS at 06:04

## 2023-04-27 RX ADMIN — KETOROLAC TROMETHAMINE 30 MG: 30 INJECTION, SOLUTION INTRAMUSCULAR; INTRAVENOUS at 12:04

## 2023-04-27 NOTE — PROGRESS NOTES
[facility] Surg  General Surgery  Progress Note    Subjective:     History of Present Illness:  51yo female with history of left colon mass presenting for left hemicolectomy.       Post-Op Info:  Procedure(s) (LRB):  HEMICOLECTOMY, LAPAROSCOPIC, LEFT CONVERTED TO OPEN (Left)  COLONOSCOPY WITH ENDOSCOPIC TATTOOING (N/A)   1 Day Post-Op     Interval History:   Patient seen and examined.  NAEON.  VSS; afebrile.  Pain controlled with medication but c/o left flank soreness.  Tolerating sips of water and ice chips.    Medications:  Continuous Infusions:   lactated ringers 125 mL/hr at 04/27/23 0608     Scheduled Meds:   enoxaparin  40 mg Subcutaneous Daily    finasteride  5 mg Oral Daily    ketorolac  30 mg Intravenous Q6H    methocarbamoL  1,000 mg Oral QID    mupirocin   Nasal BID    [START ON 4/28/2023] oxybutynin  10 mg Oral Daily    valACYclovir  500 mg Oral Daily     PRN Meds:acetaminophen, HYDROcodone-acetaminophen, LIDOcaine (PF) 10 mg/ml (1%), melatonin, metoclopramide HCl, ondansetron, sodium chloride 0.9%     Review of patient's allergies indicates:   Allergen Reactions    Avelox [moxifloxacin] Hives    Dilaudid [hydromorphone] Other (See Comments)     Intolerance but not sure    Loniten [minoxidil] Hives    Quinolones Hives     Objective:     Vital Signs (Most Recent):  Temp: 98.7 °F (37.1 °C) (04/27/23 1144)  Pulse: 110 (04/27/23 1144)  Resp: 18 (04/27/23 1144)  BP: (!) 100/56 (04/27/23 1144)  SpO2: 97 % (04/27/23 1144)   Vital Signs (24h Range):  Temp:  [97.6 °F (36.4 °C)-98.7 °F (37.1 °C)] 98.7 °F (37.1 °C)  Pulse:  [] 110  Resp:  [11-20] 18  SpO2:  [97 %-100 %] 97 %  BP: ()/(45-70) 100/56     Weight: 55 kg (121 lb 3.2 oz)  Body mass index is 21.47 kg/m².    Intake/Output - Last 3 Shifts         04/25 0700 04/26 0659 04/26 0700 04/27 0659 04/27 0700 04/28 0659    P.O.  360     I.V. (mL/kg)  3750 (68.2)     Total Intake(mL/kg)  4110 (74.7)     Urine (mL/kg/hr)  2300 (1.7)      Stool  0     Total Output  2300     Net  +1810            Stool Occurrence  1 x             Physical Exam  Constitutional:       General: She is not in acute distress.     Appearance: She is not ill-appearing or toxic-appearing.   Cardiovascular:      Rate and Rhythm: Normal rate and regular rhythm.   Pulmonary:      Effort: Pulmonary effort is normal. No respiratory distress.   Abdominal:      General: There is no distension.      Palpations: Abdomen is soft.      Tenderness: There is abdominal tenderness (LLQ). There is no guarding.      Hernia: No hernia is present.      Comments: Midline incision with dressing in place with no SOI  Appropriately tender along midline and LLQ    Musculoskeletal:      Comments: SCDs in place    Skin:     General: Skin is warm and dry.      Capillary Refill: Capillary refill takes less than 2 seconds.   Neurological:      Mental Status: She is alert. Mental status is at baseline.       Significant Labs:  I have reviewed all pertinent lab results within the past 24 hours.  CBC:   Recent Labs   Lab 04/27/23  0647   WBC 15.95*   RBC 3.33*   HGB 9.2*   HCT 28.8*      MCV 87   MCH 27.6   MCHC 31.9*     CMP:   Recent Labs   Lab 04/27/23  0647      CALCIUM 7.8*   ALBUMIN 2.5*   PROT 5.1*      K 3.7   CO2 26   *   BUN 6   CREATININE 0.6   ALKPHOS 54*   ALT 14   AST 11   BILITOT 0.3       Significant Diagnostics:  I have reviewed all pertinent imaging results/findings within the past 24 hours.    Assessment/Plan:     * Colonic mass  POD #1 s/p lap converted to open left hemicolectomy   -Sips of CLD   -Multi-modal pain control - allergy to morphine and dilaudid noted   -Out of bed to chair  -Encourage IS use   -Discontinue kemp   -Restart home PO medications  -Pepcid, lovenox   -Replace lytes as necessary         Yarelis Paez MD  General Surgery  [facility]

## 2023-04-27 NOTE — OP NOTE
[facility] - OR Periop Services  General Surgery Department  Operative Note    SUMMARY     Date of Procedure: 4/26/2023    Procedure: Procedure(s) (LRB):  HEMICOLECTOMY, LAPAROSCOPIC, LEFT CONVERTED TO OPEN:   COLONOSCOPY WITH ENDOSCOPIC TATTOOING:      Surgeon(s) and Role:  Yarelis Paez MD     Pre-Operative Diagnosis: Pre-Op Diagnosis Codes:     * Partial obstruction of colon [K56.600]     * Colonic mass [K63.89]    Post-Operative Diagnosis: Same         Anesthesia: General    Findings:  -Unable to traverse colon mass with colonoscope   -Tattoo re-applied   -Laparoscopic view obstructed by stool filled colon - converted to open   -left hemicolectomy performed with side-to-side isoperistaltic anastomosis     Indications for the Procedure:  51yo female who presents with partial obstruction secondary to mass of the distal descending colon.  Initial biopsies negative for carcinoma, yet high suggestive of malignancy.  CT chest, abdomen,and pelvis negative for detectable distant disease.  Decision made to undergo left hemicolectomy with oncologic resection due to nearly obstructing nature of tumor.     Operative Conduct in Detail:   Patient was seen in holding where all questions and concerns were addressed.  Preoperative antibiotics were hung.  Patient also underwent p.o. mechanical bowel prep at home.  Patient was brought to the operating room and placed in the supine position where endotracheal intubation was initiated.  SCDs were placed and activated prior to induction.      Patient was rolled into the left lateral decubitus positioning for the on-table colonoscopy.  A time-out was performed in the operating room with all present in agreement.  The scope was introduced through the rectum and advanced up to approximately 60 cm where the previously documented fungating mass was encountered.  Tattoo was reapplied.  Several attempts were made with a colonoscope and EGD scope to pass the lesion in order to  evaluate the remainder of the colon, however this was unsuccessful.    Patient was then rolled to the supine position where the abdomen was prepped and draped in sterile fashion.  The abdomen was accessed through the infraumbilical Swenson technique.  Upon entering the abdomen the patient noted to have a very redundant sigmoid and transverse colon which was very dilated and full of stool that was unable to be cleared with the prep.  Three additional 5 mm ports were placed in the right upper right periumbilical and right lower quadrants.  The midline incision was changed out for a GelPort.    The abdomen was inspected manually.  The surface of the liver was smooth with no obvious lesions.  The omentum transverse and right colon were also palpated with no evidence of any palpable masses or adenopathy.  The mass was easily located in the mid descending colon with no surrounding desmoplastic reaction.  There were also no palpable lymph nodes within the mesentery.  Attempts were made to dissect the colon off the white line of Toldt however due to dilation of the bowel and small abdominal  body habitus of the patient, decision was made to convert to open.  The ports were removed under direct visualization and pneumoperitoneum was alleviated.    The midline incision was extended cranially and caudally and carried down with electrocautery through skin and subcutaneous tissue.  Chico retractor was placed.  We then proceeded with continue with our dissection freeing the colon from the mid sigmoid to the mid transverse colon.  The colon was resected with a GI green load stapler.  The mesentery was taken with the laparoscopic Harmonic and any encountered vessels were ligated with stick ties.  Specimen was passed off for permanent section.      We then created our side-to-side isoperistaltic anastomosis with a GI green load to create our common channel.  The common enterotomy was closed with 2-0 Prolene interrupted seromuscular  sutures and oversewn with 2-0 silk Lembert sutures.  The free staple lines were also invaginated and Lemberted.  The mesenteric defect was closed with a 0 Vicryl suture.  The new lumen was palpated ensuring to be two finger breaths in width.     Abdomen was once again inspected for any peritoneal or omental implants of which none were found.  Abdomen was gently irrigated with warm sterile water.  Midline was closed with 0 PDS looped running suture and skin closed with staples.  Port sites were also closed staples in like fashion.  Patient was then awakened from general anesthesia and extubated in the operating room with no untoward event.  All lap lap and instrument counts were correct x2 prior to and after abdominal closure.    Complications: No    Estimated Blood Loss (EBL): 15cc           Implants: * No implants in log *    Specimens:   Left colon           Condition: Good    Disposition: PACU - hemodynamically stable.        Yarelis Paez MD  General Surgery   [phone]

## 2023-04-27 NOTE — ANESTHESIA POSTPROCEDURE EVALUATION
Anesthesia Post Evaluation    Patient: Caridad Hope    Procedure(s) Performed: Procedure(s) (LRB):  HEMICOLECTOMY, LAPAROSCOPIC, LEFT CONVERTED TO OPEN (Left)  COLONOSCOPY WITH ENDOSCOPIC TATTOOING (N/A)    Final Anesthesia Type: general      Patient location during evaluation: med/surg floor  Patient participation: Yes- Able to Participate  Level of consciousness: awake  Post-procedure vital signs: reviewed and stable  Pain management: adequate  Airway patency: patent    PONV status at discharge: No PONV  Anesthetic complications: no      Cardiovascular status: blood pressure returned to baseline  Respiratory status: spontaneous ventilation  Hydration status: euvolemic  Follow-up not needed.          Vitals Value Taken Time   /53 04/27/23 0412   Temp 36.6 °C (97.9 °F) 04/27/23 0016   Pulse 116 04/27/23 0412   Resp 20 04/27/23 0412   SpO2 99 % 04/27/23 0412         Event Time   Out of Recovery 14:10:00         Pain/Anthony Score: Pain Rating Prior to Med Admin: 9 (4/27/2023  6:04 AM)  Pain Rating Post Med Admin: 4 (4/27/2023  6:34 AM)  Anthony Score: 10 (4/26/2023  2:03 PM)

## 2023-04-27 NOTE — SUBJECTIVE & OBJECTIVE
Interval History:   Patient seen and examined.  NAEON.  VSS; afebrile.  Pain controlled with medication but c/o left flank soreness.  Tolerating sips of water and ice chips.    Medications:  Continuous Infusions:   lactated ringers 125 mL/hr at 04/27/23 0608     Scheduled Meds:   enoxaparin  40 mg Subcutaneous Daily    finasteride  5 mg Oral Daily    ketorolac  30 mg Intravenous Q6H    methocarbamoL  1,000 mg Oral QID    mupirocin   Nasal BID    [START ON 4/28/2023] oxybutynin  10 mg Oral Daily    valACYclovir  500 mg Oral Daily     PRN Meds:acetaminophen, HYDROcodone-acetaminophen, LIDOcaine (PF) 10 mg/ml (1%), melatonin, metoclopramide HCl, ondansetron, sodium chloride 0.9%     Review of patient's allergies indicates:   Allergen Reactions    Avelox [moxifloxacin] Hives    Dilaudid [hydromorphone] Other (See Comments)     Intolerance but not sure    Loniten [minoxidil] Hives    Quinolones Hives     Objective:     Vital Signs (Most Recent):  Temp: 98.7 °F (37.1 °C) (04/27/23 1144)  Pulse: 110 (04/27/23 1144)  Resp: 18 (04/27/23 1144)  BP: (!) 100/56 (04/27/23 1144)  SpO2: 97 % (04/27/23 1144)   Vital Signs (24h Range):  Temp:  [97.6 °F (36.4 °C)-98.7 °F (37.1 °C)] 98.7 °F (37.1 °C)  Pulse:  [] 110  Resp:  [11-20] 18  SpO2:  [97 %-100 %] 97 %  BP: ()/(45-70) 100/56     Weight: 55 kg (121 lb 3.2 oz)  Body mass index is 21.47 kg/m².    Intake/Output - Last 3 Shifts         04/25 0700 04/26 0659 04/26 0700 04/27 0659 04/27 0700  04/28 0659    P.O.  360     I.V. (mL/kg)  3750 (68.2)     Total Intake(mL/kg)  4110 (74.7)     Urine (mL/kg/hr)  2300 (1.7)     Stool  0     Total Output  2300     Net  +1810            Stool Occurrence  1 x             Physical Exam  Constitutional:       General: She is not in acute distress.     Appearance: She is not ill-appearing or toxic-appearing.   Cardiovascular:      Rate and Rhythm: Normal rate and regular rhythm.   Pulmonary:      Effort: Pulmonary effort is normal. No  respiratory distress.   Abdominal:      General: There is no distension.      Palpations: Abdomen is soft.      Tenderness: There is abdominal tenderness (LLQ). There is no guarding.      Hernia: No hernia is present.      Comments: Midline incision with dressing in place with no SOI  Appropriately tender along midline and LLQ    Musculoskeletal:      Comments: SCDs in place    Skin:     General: Skin is warm and dry.      Capillary Refill: Capillary refill takes less than 2 seconds.   Neurological:      Mental Status: She is alert. Mental status is at baseline.       Significant Labs:  I have reviewed all pertinent lab results within the past 24 hours.  CBC:   Recent Labs   Lab 04/27/23  0647   WBC 15.95*   RBC 3.33*   HGB 9.2*   HCT 28.8*      MCV 87   MCH 27.6   MCHC 31.9*     CMP:   Recent Labs   Lab 04/27/23  0647      CALCIUM 7.8*   ALBUMIN 2.5*   PROT 5.1*      K 3.7   CO2 26   *   BUN 6   CREATININE 0.6   ALKPHOS 54*   ALT 14   AST 11   BILITOT 0.3       Significant Diagnostics:  I have reviewed all pertinent imaging results/findings within the past 24 hours.

## 2023-04-27 NOTE — PLAN OF CARE
[place] [facility]urg  Initial Discharge Assessment       Primary Care Provider: Maya Klein MD    Admission Diagnosis: Partial obstruction of colon [K56.600]  Colonic mass [K63.89]    Admission Date: 4/26/2023  Expected Discharge Date:     Discharge Barriers Identified: None    Payor: MEDICAID / Plan: [place] / Product Type: Managed Medicaid /     Extended Emergency Contact Information  Primary Emergency Contact: Essie Sarkar   [place]  Mobile Phone: [phone]  Relation: Relative  Secondary Emergency Contact: Jason Davis   [place]  Mobile Phone: [phone]  Relation: Friend    Discharge Plan A: Home with family  Discharge Plan B: Home with family      [id number] - [facility] [address] [place] [address] & [facility]  [facility] [address] [facility] [address]  Phone: [phone] Fax: [phone]      Initial Assessment (most recent)       Adult Discharge Assessment - 04/27/23 0726          Discharge Assessment    Assessment Type Discharge Planning Assessment     Confirmed/corrected address, phone number and insurance Yes     Source of Information patient     Communicated NOVA with patient/caregiver Other (see comments)   Patient stating she expects to be in hospital 4 to 5 days.    Reason For Admission surgery removal mass     People in Home child(farzana), dependent     Facility Arrived From: home     Do you expect to return to your current living situation? Yes     Do you have help at home or someone to help you manage your care at home? Yes     Who are your caregiver(s) and their phone number(s)? Essie Sarkar, sister in law, [phone], also has 17 year old daughter.     Prior to hospitilization cognitive status: Alert/Oriented     Current cognitive status: Alert/Oriented     Equipment Currently Used at Home none     Readmission within 30 days? No     Patient currently being followed by outpatient  case management? No     Do you currently have service(s) that help you manage your care at home? No     Do you take prescription medications? Yes     Do you have any problems affording any of your prescribed medications? No     Is the patient taking medications as prescribed? yes     Who is going to help you get home at discharge? older children and sister in law     How do you get to doctors appointments? car, drives self;family or friend will provide     Are you on dialysis? No     Do you take coumadin? No     Discharge Plan A Home with family     Discharge Plan B Home with family     DME Needed Upon Discharge  none     Discharge Plan discussed with: Patient     Discharge Barriers Identified None                 Spoke to patient at bedside.  Essie Sarkar, sister in law at bedside.  She confirms contact information.  [phone].  Patient confirms address and demographics.  Lives at home with 14 year old and 17 year old.  Anticipates return to home upon discharge.  No DME at home, no DME needs identified this visit.  Independent adult.  Confirms she has someone available to help at discharge with errands at discharge.

## 2023-04-28 LAB
ALBUMIN SERPL BCP-MCNC: 2.5 G/DL (ref 3.5–5.2)
ALP SERPL-CCNC: 72 U/L (ref 55–135)
ALT SERPL W/O P-5'-P-CCNC: 15 U/L (ref 10–44)
ANION GAP SERPL CALC-SCNC: 4 MMOL/L (ref 8–16)
AST SERPL-CCNC: 16 U/L (ref 10–40)
BILIRUB SERPL-MCNC: 0.6 MG/DL (ref 0.1–1)
BUN SERPL-MCNC: 10 MG/DL (ref 6–20)
CALCIUM SERPL-MCNC: 8.2 MG/DL (ref 8.7–10.5)
CHLORIDE SERPL-SCNC: 111 MMOL/L (ref 95–110)
CO2 SERPL-SCNC: 24 MMOL/L (ref 23–29)
CREAT SERPL-MCNC: 0.6 MG/DL (ref 0.5–1.4)
ERYTHROCYTE [DISTWIDTH] IN BLOOD BY AUTOMATED COUNT: 14 % (ref 11.5–14.5)
EST. GFR  (NO RACE VARIABLE): >60 ML/MIN/1.73 M^2
GLUCOSE SERPL-MCNC: 84 MG/DL (ref 70–110)
HCT VFR BLD AUTO: 28.6 % (ref 37–48.5)
HGB BLD-MCNC: 9 G/DL (ref 12–16)
MAGNESIUM SERPL-MCNC: 1.9 MG/DL (ref 1.6–2.6)
MCH RBC QN AUTO: 27.6 PG (ref 27–31)
MCHC RBC AUTO-ENTMCNC: 31.5 G/DL (ref 32–36)
MCV RBC AUTO: 88 FL (ref 82–98)
PHOSPHATE SERPL-MCNC: 2.1 MG/DL (ref 2.7–4.5)
PLATELET # BLD AUTO: 255 K/UL (ref 150–450)
PMV BLD AUTO: 9.4 FL (ref 9.2–12.9)
POTASSIUM SERPL-SCNC: 3.6 MMOL/L (ref 3.5–5.1)
PROT SERPL-MCNC: 5.9 G/DL (ref 6–8.4)
RBC # BLD AUTO: 3.26 M/UL (ref 4–5.4)
SODIUM SERPL-SCNC: 139 MMOL/L (ref 136–145)
WBC # BLD AUTO: 16.23 K/UL (ref 3.9–12.7)

## 2023-04-28 PROCEDURE — 25000003 PHARM REV CODE 250: Performed by: INTERNAL MEDICINE

## 2023-04-28 PROCEDURE — 94761 N-INVAS EAR/PLS OXIMETRY MLT: CPT

## 2023-04-28 PROCEDURE — 63600175 PHARM REV CODE 636 W HCPCS: Performed by: STUDENT IN AN ORGANIZED HEALTH CARE EDUCATION/TRAINING PROGRAM

## 2023-04-28 PROCEDURE — 99900035 HC TECH TIME PER 15 MIN (STAT)

## 2023-04-28 PROCEDURE — 99900031 HC PATIENT EDUCATION (STAT)

## 2023-04-28 PROCEDURE — 80053 COMPREHEN METABOLIC PANEL: CPT | Performed by: STUDENT IN AN ORGANIZED HEALTH CARE EDUCATION/TRAINING PROGRAM

## 2023-04-28 PROCEDURE — 36415 COLL VENOUS BLD VENIPUNCTURE: CPT | Performed by: STUDENT IN AN ORGANIZED HEALTH CARE EDUCATION/TRAINING PROGRAM

## 2023-04-28 PROCEDURE — 11000001 HC ACUTE MED/SURG PRIVATE ROOM

## 2023-04-28 PROCEDURE — 85027 COMPLETE CBC AUTOMATED: CPT | Performed by: STUDENT IN AN ORGANIZED HEALTH CARE EDUCATION/TRAINING PROGRAM

## 2023-04-28 PROCEDURE — 84100 ASSAY OF PHOSPHORUS: CPT | Performed by: STUDENT IN AN ORGANIZED HEALTH CARE EDUCATION/TRAINING PROGRAM

## 2023-04-28 PROCEDURE — 83735 ASSAY OF MAGNESIUM: CPT | Performed by: STUDENT IN AN ORGANIZED HEALTH CARE EDUCATION/TRAINING PROGRAM

## 2023-04-28 PROCEDURE — 25000003 PHARM REV CODE 250: Performed by: STUDENT IN AN ORGANIZED HEALTH CARE EDUCATION/TRAINING PROGRAM

## 2023-04-28 RX ORDER — SIMETHICONE 80 MG
1 TABLET,CHEWABLE ORAL 3 TIMES DAILY PRN
Status: DISCONTINUED | OUTPATIENT
Start: 2023-04-28 | End: 2023-05-01 | Stop reason: HOSPADM

## 2023-04-28 RX ORDER — SODIUM CHLORIDE 0.9 % (FLUSH) 0.9 %
10 SYRINGE (ML) INJECTION
Status: DISCONTINUED | OUTPATIENT
Start: 2023-04-28 | End: 2023-05-01 | Stop reason: HOSPADM

## 2023-04-28 RX ADMIN — FINASTERIDE 5 MG: 5 TABLET, FILM COATED ORAL at 08:04

## 2023-04-28 RX ADMIN — KETOROLAC TROMETHAMINE 30 MG: 30 INJECTION, SOLUTION INTRAMUSCULAR; INTRAVENOUS at 06:04

## 2023-04-28 RX ADMIN — ACETAMINOPHEN 1000 MG: 10 INJECTION INTRAVENOUS at 06:04

## 2023-04-28 RX ADMIN — ENOXAPARIN SODIUM 40 MG: 40 INJECTION SUBCUTANEOUS at 05:04

## 2023-04-28 RX ADMIN — KETOROLAC TROMETHAMINE 30 MG: 30 INJECTION, SOLUTION INTRAMUSCULAR; INTRAVENOUS at 12:04

## 2023-04-28 RX ADMIN — METHOCARBAMOL 1000 MG: 500 TABLET ORAL at 05:04

## 2023-04-28 RX ADMIN — SIMETHICONE 80 MG: 80 TABLET, CHEWABLE ORAL at 09:04

## 2023-04-28 RX ADMIN — KETOROLAC TROMETHAMINE 30 MG: 30 INJECTION, SOLUTION INTRAMUSCULAR; INTRAVENOUS at 11:04

## 2023-04-28 RX ADMIN — HYDROCODONE BITARTRATE AND ACETAMINOPHEN 1 TABLET: 10; 325 TABLET ORAL at 10:04

## 2023-04-28 RX ADMIN — METHOCARBAMOL 1000 MG: 500 TABLET ORAL at 09:04

## 2023-04-28 RX ADMIN — METHOCARBAMOL 1000 MG: 500 TABLET ORAL at 12:04

## 2023-04-28 RX ADMIN — SODIUM CHLORIDE, POTASSIUM CHLORIDE, SODIUM LACTATE AND CALCIUM CHLORIDE: 600; 310; 30; 20 INJECTION, SOLUTION INTRAVENOUS at 06:04

## 2023-04-28 RX ADMIN — OXYBUTYNIN CHLORIDE 10 MG: 5 TABLET ORAL at 08:04

## 2023-04-28 RX ADMIN — MUPIROCIN: 20 OINTMENT TOPICAL at 08:04

## 2023-04-28 RX ADMIN — MUPIROCIN: 20 OINTMENT TOPICAL at 09:04

## 2023-04-28 RX ADMIN — VALACYCLOVIR HYDROCHLORIDE 500 MG: 500 TABLET, FILM COATED ORAL at 08:04

## 2023-04-28 RX ADMIN — METHOCARBAMOL 1000 MG: 500 TABLET ORAL at 08:04

## 2023-04-28 NOTE — SUBJECTIVE & OBJECTIVE
Interval History:   Patient seen and examined.  NAEON.  VSS; afebrile.  BM with flatus this AM.  Pain controlled with medication.  Ambulated with minimal assistance yesterday.     Medications:  Continuous Infusions:   lactated ringers 125 mL/hr at 04/28/23 0622     Scheduled Meds:   enoxaparin  40 mg Subcutaneous Daily    finasteride  5 mg Oral Daily    ketorolac  30 mg Intravenous Q6H    methocarbamoL  1,000 mg Oral QID    mupirocin   Nasal BID    oxybutynin  10 mg Oral Daily    valACYclovir  500 mg Oral Daily     PRN Meds:HYDROcodone-acetaminophen, LIDOcaine (PF) 10 mg/ml (1%), melatonin, metoclopramide HCl, morphine, ondansetron, sodium chloride 0.9%, sodium chloride 0.9%     Review of patient's allergies indicates:   Allergen Reactions    Avelox [moxifloxacin] Hives    Dilaudid [hydromorphone] Other (See Comments)     Intolerance but not sure    Loniten [minoxidil] Hives    Quinolones Hives     Objective:     Vital Signs (Most Recent):  Temp: 98.6 °F (37 °C) (04/28/23 0701)  Pulse: 108 (04/28/23 0758)  Resp: 18 (04/28/23 1049)  BP: (!) 91/47 (04/28/23 0701)  SpO2: 97 % (04/28/23 0758)   Vital Signs (24h Range):  Temp:  [98.2 °F (36.8 °C)-99.5 °F (37.5 °C)] 98.6 °F (37 °C)  Pulse:  [101-111] 108  Resp:  [16-20] 18  SpO2:  [96 %-99 %] 97 %  BP: ()/(47-56) 91/47     Weight: 55 kg (121 lb 3.2 oz)  Body mass index is 21.47 kg/m².    Intake/Output - Last 3 Shifts         04/26 0700 04/27 0659 04/27 0700 04/28 0659 04/28 0700 04/29 0659    P.O. 360 360     I.V. (mL/kg) 3750 (68.2) 1488 (27.1)     IV Piggyback  100     Total Intake(mL/kg) 4110 (74.7) 1948 (35.4)     Urine (mL/kg/hr) 2300 (1.7) 1700 (1.3)     Stool 0 0     Total Output 2300 1700     Net +1810 +248            Urine Occurrence  2 x     Stool Occurrence 1 x 1 x             Physical Exam  Constitutional:       General: She is not in acute distress.     Appearance: She is not ill-appearing or toxic-appearing.   Cardiovascular:      Rate and Rhythm:  Normal rate and regular rhythm.   Pulmonary:      Effort: Pulmonary effort is normal. No respiratory distress.   Abdominal:      General: There is no distension.      Palpations: Abdomen is soft.      Tenderness: There is abdominal tenderness (appropriately TTP around midline). There is no guarding.      Hernia: No hernia is present.      Comments: Midline incision with dressing in place    Skin:     Capillary Refill: Capillary refill takes less than 2 seconds.   Neurological:      Mental Status: She is alert. Mental status is at baseline.       Significant Labs:  I have reviewed all pertinent lab results within the past 24 hours.  CBC:   Recent Labs   Lab 04/28/23  0746   WBC 16.23*   RBC 3.26*   HGB 9.0*   HCT 28.6*      MCV 88   MCH 27.6   MCHC 31.5*     CMP:   Recent Labs   Lab 04/28/23  0746   GLU 84   CALCIUM 8.2*   ALBUMIN 2.5*   PROT 5.9*      K 3.6   CO2 24   *   BUN 10   CREATININE 0.6   ALKPHOS 72   ALT 15   AST 16   BILITOT 0.6       Significant Diagnostics:  I have reviewed all pertinent imaging results/findings within the past 24 hours.

## 2023-04-28 NOTE — ASSESSMENT & PLAN NOTE
POD #2 s/p lap converted to open left hemicolectomy   -CLD;  IVFs @ 75cc/hr  -Multi-modal pain control - allergy to morphine and dilaudid noted   -Out of bed to chair  -Encourage IS use   -WBC 16 from 15 - pt afebrile.  Likely reactive;  Continue to trend   -Restart home PO medications  -Pepcid, lovenox   -Replace lytes as necessary   -AM CBC and BMP

## 2023-04-28 NOTE — PROGRESS NOTES
[facility]  General Surgery  Progress Note    Subjective:     History of Present Illness:  53yo female with history of left colon mass presenting for left hemicolectomy.       Post-Op Info:  Procedure(s) (LRB):  HEMICOLECTOMY, LAPAROSCOPIC, LEFT CONVERTED TO OPEN (Left)  COLONOSCOPY WITH ENDOSCOPIC TATTOOING (N/A)   2 Days Post-Op     Interval History:   Patient seen and examined.  NAEON.  VSS; afebrile.  BM with flatus this AM.  Pain controlled with medication.  Ambulated with minimal assistance yesterday.     Medications:  Continuous Infusions:   lactated ringers 125 mL/hr at 04/28/23 0622     Scheduled Meds:   enoxaparin  40 mg Subcutaneous Daily    finasteride  5 mg Oral Daily    ketorolac  30 mg Intravenous Q6H    methocarbamoL  1,000 mg Oral QID    mupirocin   Nasal BID    oxybutynin  10 mg Oral Daily    valACYclovir  500 mg Oral Daily     PRN Meds:HYDROcodone-acetaminophen, LIDOcaine (PF) 10 mg/ml (1%), melatonin, metoclopramide HCl, morphine, ondansetron, sodium chloride 0.9%, sodium chloride 0.9%     Review of patient's allergies indicates:   Allergen Reactions    Avelox [moxifloxacin] Hives    Dilaudid [hydromorphone] Other (See Comments)     Intolerance but not sure    Loniten [minoxidil] Hives    Quinolones Hives     Objective:     Vital Signs (Most Recent):  Temp: 98.6 °F (37 °C) (04/28/23 0701)  Pulse: 108 (04/28/23 0758)  Resp: 18 (04/28/23 1049)  BP: (!) 91/47 (04/28/23 0701)  SpO2: 97 % (04/28/23 0758)   Vital Signs (24h Range):  Temp:  [98.2 °F (36.8 °C)-99.5 °F (37.5 °C)] 98.6 °F (37 °C)  Pulse:  [101-111] 108  Resp:  [16-20] 18  SpO2:  [96 %-99 %] 97 %  BP: ()/(47-56) 91/47     Weight: 55 kg (121 lb 3.2 oz)  Body mass index is 21.47 kg/m².    Intake/Output - Last 3 Shifts         04/26 0700 04/27 0659 04/27 0700 04/28 0659 04/28 0700 04/29 0659    P.O. 360 360     I.V. (mL/kg) 3750 (68.2) 1488 (27.1)     IV Piggyback  100     Total Intake(mL/kg) 4110 (74.7) 1948  (35.4)     Urine (mL/kg/hr) 2300 (1.7) 1700 (1.3)     Stool 0 0     Total Output 2300 1700     Net +1810 +248            Urine Occurrence  2 x     Stool Occurrence 1 x 1 x             Physical Exam  Constitutional:       General: She is not in acute distress.     Appearance: She is not ill-appearing or toxic-appearing.   Cardiovascular:      Rate and Rhythm: Normal rate and regular rhythm.   Pulmonary:      Effort: Pulmonary effort is normal. No respiratory distress.   Abdominal:      General: There is no distension.      Palpations: Abdomen is soft.      Tenderness: There is abdominal tenderness (appropriately TTP around midline). There is no guarding.      Hernia: No hernia is present.      Comments: Midline incision with dressing in place    Skin:     Capillary Refill: Capillary refill takes less than 2 seconds.   Neurological:      Mental Status: She is alert. Mental status is at baseline.       Significant Labs:  I have reviewed all pertinent lab results within the past 24 hours.  CBC:   Recent Labs   Lab 04/28/23  0746   WBC 16.23*   RBC 3.26*   HGB 9.0*   HCT 28.6*      MCV 88   MCH 27.6   MCHC 31.5*     CMP:   Recent Labs   Lab 04/28/23  0746   GLU 84   CALCIUM 8.2*   ALBUMIN 2.5*   PROT 5.9*      K 3.6   CO2 24   *   BUN 10   CREATININE 0.6   ALKPHOS 72   ALT 15   AST 16   BILITOT 0.6       Significant Diagnostics:  I have reviewed all pertinent imaging results/findings within the past 24 hours.    Assessment/Plan:     * Colonic mass  POD #2 s/p lap converted to open left hemicolectomy   -CLD;  IVFs @ 75cc/hr  -Multi-modal pain control - allergy to morphine and dilaudid noted   -Out of bed to chair  -Encourage IS use   -WBC 16 from 15 - pt afebrile.  Likely reactive;  Continue to trend   -Restart home PO medications  -Pepcid, lovenox   -Replace lytes as necessary   -AM CBC and BMP        Yarelis Peaz MD  General Surgery  [facility]

## 2023-04-29 LAB
ANION GAP SERPL CALC-SCNC: 3 MMOL/L (ref 8–16)
BUN SERPL-MCNC: 7 MG/DL (ref 6–20)
CALCIUM SERPL-MCNC: 8.3 MG/DL (ref 8.7–10.5)
CHLORIDE SERPL-SCNC: 112 MMOL/L (ref 95–110)
CO2 SERPL-SCNC: 27 MMOL/L (ref 23–29)
CREAT SERPL-MCNC: 0.6 MG/DL (ref 0.5–1.4)
ERYTHROCYTE [DISTWIDTH] IN BLOOD BY AUTOMATED COUNT: 13.7 % (ref 11.5–14.5)
EST. GFR  (NO RACE VARIABLE): >60 ML/MIN/1.73 M^2
GLUCOSE SERPL-MCNC: 105 MG/DL (ref 70–110)
HCT VFR BLD AUTO: 26.2 % (ref 37–48.5)
HGB BLD-MCNC: 8.3 G/DL (ref 12–16)
MAGNESIUM SERPL-MCNC: 1.9 MG/DL (ref 1.6–2.6)
MCH RBC QN AUTO: 27.4 PG (ref 27–31)
MCHC RBC AUTO-ENTMCNC: 31.7 G/DL (ref 32–36)
MCV RBC AUTO: 87 FL (ref 82–98)
PHOSPHATE SERPL-MCNC: 1.6 MG/DL (ref 2.7–4.5)
PLATELET # BLD AUTO: 242 K/UL (ref 150–450)
PMV BLD AUTO: 9.6 FL (ref 9.2–12.9)
POTASSIUM SERPL-SCNC: 3.1 MMOL/L (ref 3.5–5.1)
RBC # BLD AUTO: 3.03 M/UL (ref 4–5.4)
SODIUM SERPL-SCNC: 142 MMOL/L (ref 136–145)
WBC # BLD AUTO: 9.98 K/UL (ref 3.9–12.7)

## 2023-04-29 PROCEDURE — 36415 COLL VENOUS BLD VENIPUNCTURE: CPT | Performed by: STUDENT IN AN ORGANIZED HEALTH CARE EDUCATION/TRAINING PROGRAM

## 2023-04-29 PROCEDURE — 11000001 HC ACUTE MED/SURG PRIVATE ROOM

## 2023-04-29 PROCEDURE — 83735 ASSAY OF MAGNESIUM: CPT | Performed by: STUDENT IN AN ORGANIZED HEALTH CARE EDUCATION/TRAINING PROGRAM

## 2023-04-29 PROCEDURE — 85027 COMPLETE CBC AUTOMATED: CPT | Performed by: STUDENT IN AN ORGANIZED HEALTH CARE EDUCATION/TRAINING PROGRAM

## 2023-04-29 PROCEDURE — 63600175 PHARM REV CODE 636 W HCPCS: Performed by: STUDENT IN AN ORGANIZED HEALTH CARE EDUCATION/TRAINING PROGRAM

## 2023-04-29 PROCEDURE — 94761 N-INVAS EAR/PLS OXIMETRY MLT: CPT

## 2023-04-29 PROCEDURE — 99900035 HC TECH TIME PER 15 MIN (STAT)

## 2023-04-29 PROCEDURE — 84100 ASSAY OF PHOSPHORUS: CPT | Performed by: STUDENT IN AN ORGANIZED HEALTH CARE EDUCATION/TRAINING PROGRAM

## 2023-04-29 PROCEDURE — 63600175 PHARM REV CODE 636 W HCPCS: Performed by: SURGERY

## 2023-04-29 PROCEDURE — 25000003 PHARM REV CODE 250: Performed by: STUDENT IN AN ORGANIZED HEALTH CARE EDUCATION/TRAINING PROGRAM

## 2023-04-29 PROCEDURE — 99900031 HC PATIENT EDUCATION (STAT)

## 2023-04-29 PROCEDURE — 80048 BASIC METABOLIC PNL TOTAL CA: CPT | Performed by: STUDENT IN AN ORGANIZED HEALTH CARE EDUCATION/TRAINING PROGRAM

## 2023-04-29 RX ORDER — MAGNESIUM SULFATE HEPTAHYDRATE 40 MG/ML
2 INJECTION, SOLUTION INTRAVENOUS ONCE
Status: COMPLETED | OUTPATIENT
Start: 2023-04-29 | End: 2023-04-29

## 2023-04-29 RX ORDER — POTASSIUM CHLORIDE 7.45 MG/ML
10 INJECTION INTRAVENOUS
Status: COMPLETED | OUTPATIENT
Start: 2023-04-29 | End: 2023-04-29

## 2023-04-29 RX ADMIN — METHOCARBAMOL 1000 MG: 500 TABLET ORAL at 12:04

## 2023-04-29 RX ADMIN — METHOCARBAMOL 1000 MG: 500 TABLET ORAL at 08:04

## 2023-04-29 RX ADMIN — MAGNESIUM SULFATE HEPTAHYDRATE 2 G: 40 INJECTION, SOLUTION INTRAVENOUS at 12:04

## 2023-04-29 RX ADMIN — METHOCARBAMOL 1000 MG: 500 TABLET ORAL at 05:04

## 2023-04-29 RX ADMIN — KETOROLAC TROMETHAMINE 30 MG: 30 INJECTION, SOLUTION INTRAMUSCULAR; INTRAVENOUS at 06:04

## 2023-04-29 RX ADMIN — MORPHINE SULFATE 4 MG: 4 INJECTION INTRAVENOUS at 10:04

## 2023-04-29 RX ADMIN — VALACYCLOVIR HYDROCHLORIDE 500 MG: 500 TABLET, FILM COATED ORAL at 08:04

## 2023-04-29 RX ADMIN — MORPHINE SULFATE 4 MG: 4 INJECTION INTRAVENOUS at 03:04

## 2023-04-29 RX ADMIN — MUPIROCIN: 20 OINTMENT TOPICAL at 08:04

## 2023-04-29 RX ADMIN — OXYBUTYNIN CHLORIDE 10 MG: 5 TABLET ORAL at 08:04

## 2023-04-29 RX ADMIN — MORPHINE SULFATE 4 MG: 4 INJECTION INTRAVENOUS at 05:04

## 2023-04-29 RX ADMIN — KETOROLAC TROMETHAMINE 30 MG: 30 INJECTION, SOLUTION INTRAMUSCULAR; INTRAVENOUS at 12:04

## 2023-04-29 RX ADMIN — SODIUM CHLORIDE, POTASSIUM CHLORIDE, SODIUM LACTATE AND CALCIUM CHLORIDE: 600; 310; 30; 20 INJECTION, SOLUTION INTRAVENOUS at 06:04

## 2023-04-29 RX ADMIN — POTASSIUM CHLORIDE 10 MEQ: 10 INJECTION, SOLUTION INTRAVENOUS at 02:04

## 2023-04-29 RX ADMIN — FINASTERIDE 5 MG: 5 TABLET, FILM COATED ORAL at 08:04

## 2023-04-29 RX ADMIN — HYDROCODONE BITARTRATE AND ACETAMINOPHEN 1 TABLET: 10; 325 TABLET ORAL at 08:04

## 2023-04-29 RX ADMIN — POTASSIUM CHLORIDE 10 MEQ: 10 INJECTION, SOLUTION INTRAVENOUS at 04:04

## 2023-04-29 RX ADMIN — Medication 6 MG: at 10:04

## 2023-04-29 RX ADMIN — POTASSIUM CHLORIDE 10 MEQ: 10 INJECTION, SOLUTION INTRAVENOUS at 12:04

## 2023-04-29 RX ADMIN — POTASSIUM CHLORIDE 10 MEQ: 10 INJECTION, SOLUTION INTRAVENOUS at 03:04

## 2023-04-29 RX ADMIN — ENOXAPARIN SODIUM 40 MG: 40 INJECTION SUBCUTANEOUS at 05:04

## 2023-04-29 NOTE — PROGRESS NOTES
[facility]  General Surgery  Progress Note    Subjective:     History of Present Illness:  53yo female with history of left colon mass presenting for left hemicolectomy.       Post-Op Info:  Procedure(s) (LRB):  HEMICOLECTOMY, LAPAROSCOPIC, LEFT CONVERTED TO OPEN (Left)  COLONOSCOPY WITH ENDOSCOPIC TATTOOING (N/A)   3 Days Post-Op     Interval History: No new issues.  Still some mild nausea.  She has had a couple of bowel movements.  Minimal pain.  Ambulating some in the halls.    Medications:  Continuous Infusions:   lactated ringers 75 mL/hr at 04/29/23 0604     Scheduled Meds:   enoxaparin  40 mg Subcutaneous Daily    finasteride  5 mg Oral Daily    ketorolac  30 mg Intravenous Q6H    methocarbamoL  1,000 mg Oral QID    mupirocin   Nasal BID    oxybutynin  10 mg Oral Daily    valACYclovir  500 mg Oral Daily     PRN Meds:HYDROcodone-acetaminophen, LIDOcaine (PF) 10 mg/ml (1%), melatonin, metoclopramide HCl, morphine, ondansetron, simethicone, sodium chloride 0.9%, sodium chloride 0.9%     Review of patient's allergies indicates:   Allergen Reactions    Avelox [moxifloxacin] Hives    Dilaudid [hydromorphone] Other (See Comments)     Intolerance but not sure    Loniten [minoxidil] Hives    Quinolones Hives     Objective:     Vital Signs (Most Recent):  Temp: 98.5 °F (36.9 °C) (04/29/23 1137)  Pulse: 109 (04/29/23 1137)  Resp: 20 (04/29/23 1137)  BP: 116/60 (04/29/23 1137)  SpO2: 97 % (04/29/23 1137) Vital Signs (24h Range):  Temp:  [98.5 °F (36.9 °C)-99.1 °F (37.3 °C)] 98.5 °F (36.9 °C)  Pulse:  [103-117] 109  Resp:  [16-22] 20  SpO2:  [96 %-100 %] 97 %  BP: (106-126)/(51-62) 116/60     Weight: 55 kg (121 lb 3.2 oz)  Body mass index is 21.47 kg/m².    Intake/Output - Last 3 Shifts         04/27 0700 04/28 0659 04/28 0700 04/29 0659 04/29 0700 04/30 0659    P.O. 360 1760     I.V. (mL/kg) 1488 (27.1) 1823.8 (33.2)     IV Piggyback 100      Total Intake(mL/kg) 1948 (35.4) 3583.8 (65.2)     Urine (mL/kg/hr)  1700 (1.3) 800 (0.6)     Stool 0 0     Total Output 1700 800     Net +248 +2783.8            Urine Occurrence 2 x 6 x     Stool Occurrence 1 x 3 x             Physical Exam  Vitals and nursing note reviewed.   Constitutional:       General: She is not in acute distress.     Appearance: Normal appearance. She is not ill-appearing or toxic-appearing.   HENT:      Mouth/Throat:      Mouth: Mucous membranes are moist.      Pharynx: Oropharynx is clear.   Eyes:      Extraocular Movements: Extraocular movements intact.      Conjunctiva/sclera: Conjunctivae normal.      Pupils: Pupils are equal, round, and reactive to light.   Cardiovascular:      Rate and Rhythm: Normal rate and regular rhythm.   Pulmonary:      Effort: Pulmonary effort is normal. No respiratory distress.   Abdominal:      General: Bowel sounds are normal. There is no distension.      Palpations: Abdomen is soft.      Tenderness: There is abdominal tenderness (appropriately TTP around midline). There is no guarding.      Hernia: No hernia is present.      Comments: Midline incision with dressing in place    Skin:     Capillary Refill: Capillary refill takes less than 2 seconds.   Neurological:      Mental Status: She is alert and oriented to person, place, and time. Mental status is at baseline.   Psychiatric:         Mood and Affect: Mood normal.         Behavior: Behavior normal.         Thought Content: Thought content normal.         Judgment: Judgment normal.       Significant Labs:  I have reviewed all pertinent lab results within the past 24 hours.  CBC:   Recent Labs   Lab 04/29/23 0420   WBC 9.98   RBC 3.03*   HGB 8.3*   HCT 26.2*      MCV 87   MCH 27.4   MCHC 31.7*     BMP:   Recent Labs   Lab 04/29/23  0420         K 3.1*   *   CO2 27   BUN 7   CREATININE 0.6   CALCIUM 8.3*   MG 1.9     CMP:   Recent Labs   Lab 04/28/23  0746 04/29/23  0420   GLU 84 105   CALCIUM 8.2* 8.3*   ALBUMIN 2.5*  --    PROT 5.9*  --      142   K 3.6 3.1*   CO2 24 27   * 112*   BUN 10 7   CREATININE 0.6 0.6   ALKPHOS 72  --    ALT 15  --    AST 16  --    BILITOT 0.6  --        Significant Diagnostics:  I have reviewed all pertinent imaging results/findings within the past 24 hours.    Assessment/Plan:     * Colonic mass  POD #3 s/p lap converted to open left hemicolectomy   -advanced diet  -Multi-modal pain control - allergy to morphine and dilaudid noted   -continue to ambulate  -Encourage IS use   -WBC normal.  Likely reactive;  Continue to trend   -continue home PO medications  -Pepcid, lovenox   -Replace potassium and magnesium         Tamra Harry MD  General Surgery  [facility]

## 2023-04-29 NOTE — SUBJECTIVE & OBJECTIVE
Interval History: No new issues    Medications:  Continuous Infusions:   lactated ringers 75 mL/hr at 04/29/23 0604     Scheduled Meds:   enoxaparin  40 mg Subcutaneous Daily    finasteride  5 mg Oral Daily    ketorolac  30 mg Intravenous Q6H    methocarbamoL  1,000 mg Oral QID    mupirocin   Nasal BID    oxybutynin  10 mg Oral Daily    valACYclovir  500 mg Oral Daily     PRN Meds:HYDROcodone-acetaminophen, LIDOcaine (PF) 10 mg/ml (1%), melatonin, metoclopramide HCl, morphine, ondansetron, simethicone, sodium chloride 0.9%, sodium chloride 0.9%     Review of patient's allergies indicates:   Allergen Reactions    Avelox [moxifloxacin] Hives    Dilaudid [hydromorphone] Other (See Comments)     Intolerance but not sure    Loniten [minoxidil] Hives    Quinolones Hives     Objective:     Vital Signs (Most Recent):  Temp: 98.5 °F (36.9 °C) (04/29/23 1137)  Pulse: 109 (04/29/23 1137)  Resp: 20 (04/29/23 1137)  BP: 116/60 (04/29/23 1137)  SpO2: 97 % (04/29/23 1137) Vital Signs (24h Range):  Temp:  [98.5 °F (36.9 °C)-99.1 °F (37.3 °C)] 98.5 °F (36.9 °C)  Pulse:  [103-117] 109  Resp:  [16-22] 20  SpO2:  [96 %-100 %] 97 %  BP: (106-126)/(51-62) 116/60     Weight: 55 kg (121 lb 3.2 oz)  Body mass index is 21.47 kg/m².    Intake/Output - Last 3 Shifts         04/27 0700 04/28 0659 04/28 0700 04/29 0659 04/29 0700 04/30 0659    P.O. 360 1760     I.V. (mL/kg) 1488 (27.1) 1823.8 (33.2)     IV Piggyback 100      Total Intake(mL/kg) 1948 (35.4) 3583.8 (65.2)     Urine (mL/kg/hr) 1700 (1.3) 800 (0.6)     Stool 0 0     Total Output 1700 800     Net +248 +2783.8            Urine Occurrence 2 x 6 x     Stool Occurrence 1 x 3 x             Physical Exam  Vitals and nursing note reviewed.   Constitutional:       General: She is not in acute distress.     Appearance: Normal appearance. She is not ill-appearing or toxic-appearing.   HENT:      Mouth/Throat:      Mouth: Mucous membranes are moist.      Pharynx: Oropharynx is clear.   Eyes:       Extraocular Movements: Extraocular movements intact.      Conjunctiva/sclera: Conjunctivae normal.      Pupils: Pupils are equal, round, and reactive to light.   Cardiovascular:      Rate and Rhythm: Normal rate and regular rhythm.   Pulmonary:      Effort: Pulmonary effort is normal. No respiratory distress.   Abdominal:      General: Bowel sounds are normal. There is no distension.      Palpations: Abdomen is soft.      Tenderness: There is abdominal tenderness (appropriately TTP around midline). There is no guarding.      Hernia: No hernia is present.      Comments: Midline incision with dressing in place    Skin:     Capillary Refill: Capillary refill takes less than 2 seconds.   Neurological:      Mental Status: She is alert and oriented to person, place, and time. Mental status is at baseline.   Psychiatric:         Mood and Affect: Mood normal.         Behavior: Behavior normal.         Thought Content: Thought content normal.         Judgment: Judgment normal.       Significant Labs:  I have reviewed all pertinent lab results within the past 24 hours.  CBC:   Recent Labs   Lab 04/29/23 0420   WBC 9.98   RBC 3.03*   HGB 8.3*   HCT 26.2*      MCV 87   MCH 27.4   MCHC 31.7*     BMP:   Recent Labs   Lab 04/29/23 0420         K 3.1*   *   CO2 27   BUN 7   CREATININE 0.6   CALCIUM 8.3*   MG 1.9     CMP:   Recent Labs   Lab 04/28/23  0746 04/29/23 0420   GLU 84 105   CALCIUM 8.2* 8.3*   ALBUMIN 2.5*  --    PROT 5.9*  --     142   K 3.6 3.1*   CO2 24 27   * 112*   BUN 10 7   CREATININE 0.6 0.6   ALKPHOS 72  --    ALT 15  --    AST 16  --    BILITOT 0.6  --        Significant Diagnostics:  I have reviewed all pertinent imaging results/findings within the past 24 hours.

## 2023-04-30 LAB
ANION GAP SERPL CALC-SCNC: 4 MMOL/L (ref 8–16)
BASOPHILS # BLD AUTO: 0.01 K/UL (ref 0–0.2)
BASOPHILS NFR BLD: 0.1 % (ref 0–1.9)
BUN SERPL-MCNC: 8 MG/DL (ref 6–20)
CALCIUM SERPL-MCNC: 8.2 MG/DL (ref 8.7–10.5)
CHLORIDE SERPL-SCNC: 108 MMOL/L (ref 95–110)
CO2 SERPL-SCNC: 27 MMOL/L (ref 23–29)
CREAT SERPL-MCNC: 0.5 MG/DL (ref 0.5–1.4)
DIFFERENTIAL METHOD: ABNORMAL
EOSINOPHIL # BLD AUTO: 0.4 K/UL (ref 0–0.5)
EOSINOPHIL NFR BLD: 4.8 % (ref 0–8)
ERYTHROCYTE [DISTWIDTH] IN BLOOD BY AUTOMATED COUNT: 13.7 % (ref 11.5–14.5)
EST. GFR  (NO RACE VARIABLE): >60 ML/MIN/1.73 M^2
GLUCOSE SERPL-MCNC: 101 MG/DL (ref 70–110)
HCT VFR BLD AUTO: 25.7 % (ref 37–48.5)
HGB BLD-MCNC: 8.1 G/DL (ref 12–16)
IMM GRANULOCYTES # BLD AUTO: 0.03 K/UL (ref 0–0.04)
IMM GRANULOCYTES NFR BLD AUTO: 0.4 % (ref 0–0.5)
LYMPHOCYTES # BLD AUTO: 0.8 K/UL (ref 1–4.8)
LYMPHOCYTES NFR BLD: 9.9 % (ref 18–48)
MAGNESIUM SERPL-MCNC: 1.9 MG/DL (ref 1.6–2.6)
MCH RBC QN AUTO: 27.4 PG (ref 27–31)
MCHC RBC AUTO-ENTMCNC: 31.5 G/DL (ref 32–36)
MCV RBC AUTO: 87 FL (ref 82–98)
MONOCYTES # BLD AUTO: 0.6 K/UL (ref 0.3–1)
MONOCYTES NFR BLD: 7.5 % (ref 4–15)
NEUTROPHILS # BLD AUTO: 6.6 K/UL (ref 1.8–7.7)
NEUTROPHILS NFR BLD: 77.3 % (ref 38–73)
NRBC BLD-RTO: 0 /100 WBC
PLATELET # BLD AUTO: 285 K/UL (ref 150–450)
PMV BLD AUTO: 9.3 FL (ref 9.2–12.9)
POTASSIUM SERPL-SCNC: 3.2 MMOL/L (ref 3.5–5.1)
RBC # BLD AUTO: 2.96 M/UL (ref 4–5.4)
SODIUM SERPL-SCNC: 139 MMOL/L (ref 136–145)
WBC # BLD AUTO: 8.5 K/UL (ref 3.9–12.7)

## 2023-04-30 PROCEDURE — 99900031 HC PATIENT EDUCATION (STAT)

## 2023-04-30 PROCEDURE — 94799 UNLISTED PULMONARY SVC/PX: CPT

## 2023-04-30 PROCEDURE — 25000003 PHARM REV CODE 250: Performed by: SURGERY

## 2023-04-30 PROCEDURE — 36415 COLL VENOUS BLD VENIPUNCTURE: CPT | Performed by: SURGERY

## 2023-04-30 PROCEDURE — 83735 ASSAY OF MAGNESIUM: CPT | Performed by: SURGERY

## 2023-04-30 PROCEDURE — 25000003 PHARM REV CODE 250: Performed by: INTERNAL MEDICINE

## 2023-04-30 PROCEDURE — 11000001 HC ACUTE MED/SURG PRIVATE ROOM

## 2023-04-30 PROCEDURE — 80048 BASIC METABOLIC PNL TOTAL CA: CPT | Performed by: SURGERY

## 2023-04-30 PROCEDURE — 63600175 PHARM REV CODE 636 W HCPCS: Performed by: STUDENT IN AN ORGANIZED HEALTH CARE EDUCATION/TRAINING PROGRAM

## 2023-04-30 PROCEDURE — 25000003 PHARM REV CODE 250: Performed by: STUDENT IN AN ORGANIZED HEALTH CARE EDUCATION/TRAINING PROGRAM

## 2023-04-30 PROCEDURE — 99900035 HC TECH TIME PER 15 MIN (STAT)

## 2023-04-30 PROCEDURE — 85025 COMPLETE CBC W/AUTO DIFF WBC: CPT | Performed by: SURGERY

## 2023-04-30 PROCEDURE — 94761 N-INVAS EAR/PLS OXIMETRY MLT: CPT

## 2023-04-30 RX ORDER — LANOLIN ALCOHOL/MO/W.PET/CERES
400 CREAM (GRAM) TOPICAL DAILY
Status: DISCONTINUED | OUTPATIENT
Start: 2023-04-30 | End: 2023-05-01 | Stop reason: HOSPADM

## 2023-04-30 RX ORDER — POTASSIUM CHLORIDE 20 MEQ/1
20 TABLET, EXTENDED RELEASE ORAL DAILY
Status: DISCONTINUED | OUTPATIENT
Start: 2023-04-30 | End: 2023-05-01 | Stop reason: HOSPADM

## 2023-04-30 RX ADMIN — POTASSIUM CHLORIDE 20 MEQ: 1500 TABLET, EXTENDED RELEASE ORAL at 05:04

## 2023-04-30 RX ADMIN — FINASTERIDE 5 MG: 5 TABLET, FILM COATED ORAL at 09:04

## 2023-04-30 RX ADMIN — MAGNESIUM GLUCONATE 500 MG ORAL TABLET 400 MG: 500 TABLET ORAL at 05:04

## 2023-04-30 RX ADMIN — SIMETHICONE 80 MG: 80 TABLET, CHEWABLE ORAL at 08:04

## 2023-04-30 RX ADMIN — HYDROCODONE BITARTRATE AND ACETAMINOPHEN 1 TABLET: 10; 325 TABLET ORAL at 09:04

## 2023-04-30 RX ADMIN — SIMETHICONE 80 MG: 80 TABLET, CHEWABLE ORAL at 09:04

## 2023-04-30 RX ADMIN — MORPHINE SULFATE 4 MG: 4 INJECTION INTRAVENOUS at 05:04

## 2023-04-30 RX ADMIN — ENOXAPARIN SODIUM 40 MG: 40 INJECTION SUBCUTANEOUS at 04:04

## 2023-04-30 RX ADMIN — MUPIROCIN: 20 OINTMENT TOPICAL at 09:04

## 2023-04-30 RX ADMIN — HYDROCODONE BITARTRATE AND ACETAMINOPHEN 1 TABLET: 10; 325 TABLET ORAL at 02:04

## 2023-04-30 RX ADMIN — METHOCARBAMOL 1000 MG: 500 TABLET ORAL at 09:04

## 2023-04-30 RX ADMIN — METHOCARBAMOL 1000 MG: 500 TABLET ORAL at 08:04

## 2023-04-30 RX ADMIN — VALACYCLOVIR HYDROCHLORIDE 500 MG: 500 TABLET, FILM COATED ORAL at 09:04

## 2023-04-30 RX ADMIN — OXYBUTYNIN CHLORIDE 10 MG: 5 TABLET ORAL at 09:04

## 2023-04-30 RX ADMIN — MUPIROCIN: 20 OINTMENT TOPICAL at 08:04

## 2023-04-30 RX ADMIN — METHOCARBAMOL 1000 MG: 500 TABLET ORAL at 12:04

## 2023-04-30 RX ADMIN — METHOCARBAMOL 1000 MG: 500 TABLET ORAL at 04:04

## 2023-04-30 RX ADMIN — HYDROCODONE BITARTRATE AND ACETAMINOPHEN 1 TABLET: 10; 325 TABLET ORAL at 08:04

## 2023-05-01 VITALS
BODY MASS INDEX: 21.47 KG/M2 | WEIGHT: 121.19 LBS | HEART RATE: 104 BPM | HEIGHT: 63 IN | RESPIRATION RATE: 20 BRPM | DIASTOLIC BLOOD PRESSURE: 56 MMHG | TEMPERATURE: 98 F | SYSTOLIC BLOOD PRESSURE: 113 MMHG | OXYGEN SATURATION: 99 %

## 2023-05-01 PROCEDURE — 94799 UNLISTED PULMONARY SVC/PX: CPT

## 2023-05-01 PROCEDURE — 99900035 HC TECH TIME PER 15 MIN (STAT)

## 2023-05-01 PROCEDURE — 99900031 HC PATIENT EDUCATION (STAT)

## 2023-05-01 PROCEDURE — 25000003 PHARM REV CODE 250: Performed by: SURGERY

## 2023-05-01 PROCEDURE — 94761 N-INVAS EAR/PLS OXIMETRY MLT: CPT

## 2023-05-01 PROCEDURE — 25000003 PHARM REV CODE 250: Performed by: INTERNAL MEDICINE

## 2023-05-01 PROCEDURE — 25000003 PHARM REV CODE 250: Performed by: STUDENT IN AN ORGANIZED HEALTH CARE EDUCATION/TRAINING PROGRAM

## 2023-05-01 RX ORDER — HYDROCODONE BITARTRATE AND ACETAMINOPHEN 10; 325 MG/1; MG/1
1 TABLET ORAL EVERY 6 HOURS PRN
Qty: 28 TABLET | Refills: 0 | Status: SHIPPED | OUTPATIENT
Start: 2023-05-01 | End: 2023-05-08

## 2023-05-01 RX ORDER — METHOCARBAMOL 500 MG/1
1000 TABLET, FILM COATED ORAL 4 TIMES DAILY
Qty: 80 TABLET | Refills: 0 | Status: SHIPPED | OUTPATIENT
Start: 2023-05-01 | End: 2023-05-11

## 2023-05-01 RX ORDER — FAMOTIDINE 20 MG/1
20 TABLET, FILM COATED ORAL 2 TIMES DAILY
Qty: 60 TABLET | Refills: 0 | Status: SHIPPED | OUTPATIENT
Start: 2023-05-01 | End: 2023-06-14

## 2023-05-01 RX ORDER — ONDANSETRON 4 MG/1
4 TABLET, FILM COATED ORAL EVERY 6 HOURS PRN
Qty: 40 TABLET | Refills: 0 | Status: SHIPPED | OUTPATIENT
Start: 2023-05-01 | End: 2023-05-11

## 2023-05-01 RX ADMIN — OXYBUTYNIN CHLORIDE 10 MG: 5 TABLET ORAL at 08:05

## 2023-05-01 RX ADMIN — SIMETHICONE 80 MG: 80 TABLET, CHEWABLE ORAL at 03:05

## 2023-05-01 RX ADMIN — SIMETHICONE 80 MG: 80 TABLET, CHEWABLE ORAL at 08:05

## 2023-05-01 RX ADMIN — HYDROCODONE BITARTRATE AND ACETAMINOPHEN 1 TABLET: 10; 325 TABLET ORAL at 03:05

## 2023-05-01 RX ADMIN — MAGNESIUM GLUCONATE 500 MG ORAL TABLET 400 MG: 500 TABLET ORAL at 08:05

## 2023-05-01 RX ADMIN — HYDROCODONE BITARTRATE AND ACETAMINOPHEN 1 TABLET: 10; 325 TABLET ORAL at 08:05

## 2023-05-01 RX ADMIN — METHOCARBAMOL 1000 MG: 500 TABLET ORAL at 08:05

## 2023-05-01 RX ADMIN — MUPIROCIN: 20 OINTMENT TOPICAL at 08:05

## 2023-05-01 RX ADMIN — FINASTERIDE 5 MG: 5 TABLET, FILM COATED ORAL at 08:05

## 2023-05-01 RX ADMIN — POTASSIUM CHLORIDE 20 MEQ: 1500 TABLET, EXTENDED RELEASE ORAL at 08:05

## 2023-05-01 RX ADMIN — VALACYCLOVIR HYDROCHLORIDE 500 MG: 500 TABLET, FILM COATED ORAL at 08:05

## 2023-05-01 NOTE — PLAN OF CARE
POC reviewed with pt, reiterated walking/moving around to help with gas pain, PRN medication simethicone administered twice t/o shift with moderate to full relief. Pt is AAO x 4, surgical dressing dry/intact, no drainage noted, IV site clean/dry/intact, pt denies needs/pain at this time will continue to monitor.

## 2023-05-02 NOTE — PROGRESS NOTES
[facility]  General Surgery  Progress Note  4/30/23    Subjective:     History of Present Illness:  53yo female with history of left colon mass presenting for left hemicolectomy.       Post-Op Info:  Procedure(s) (LRB):  HEMICOLECTOMY, LAPAROSCOPIC, LEFT CONVERTED TO OPEN (Left)  COLONOSCOPY WITH ENDOSCOPIC TATTOOING (N/A)        Interval History:  Tolerating low residue diet.  Minimal pain.  Ambulating.  Very mild nausea but no vomiting.  Concerned about swelling in her lower abdomen and labia    Medications:  Continuous Infusions:  Scheduled Meds:  PRN Meds:     Review of patient's allergies indicates:   Allergen Reactions    Avelox [moxifloxacin] Hives    Dilaudid [hydromorphone] Other (See Comments)     Intolerance but not sure    Loniten [minoxidil] Hives    Quinolones Hives     Objective:     Vital Signs (Most Recent):  VSSAF   Vital Signs (24h Range):        Weight: 55 kg (121 lb 3.2 oz)  Body mass index is 21.47 kg/m².    Intake/Output - Last 3 Shifts         04/30 0700  05/01 0659 05/01 0700  05/02 0659 05/02 0700  05/03 0659    P.O. 1040      I.V. (mL/kg)       IV Piggyback       Total Intake(mL/kg) 1040 (18.9)      Urine (mL/kg/hr) 900 (0.7)      Stool 0      Total Output 900      Net +140             Urine Occurrence 7 x      Stool Occurrence 3 x              Physical Exam  Vitals and nursing note reviewed.   Constitutional:       General: She is not in acute distress.     Appearance: She is not ill-appearing or toxic-appearing.   Cardiovascular:      Rate and Rhythm: Normal rate and regular rhythm.   Pulmonary:      Effort: Pulmonary effort is normal. No respiratory distress.   Abdominal:      General: There is no distension.      Palpations: Abdomen is soft.      Tenderness: There is abdominal tenderness (appropriately TTP around midline). There is no guarding.      Hernia: No hernia is present.      Comments: Midline incision with dressing in place    Skin:     General: Skin is warm and  dry.      Comments: Swelling in her labia and lower abdomen consistent with 3rd space fluid   Neurological:      Mental Status: She is alert. Mental status is at baseline.       Significant Labs:  I have reviewed all pertinent lab results within the past 24 hours.    Significant Diagnostics:  I have reviewed all pertinent imaging results/findings within the past 24 hours.    Assessment/Plan:     * Colonic mass  POD #4 s/p lap converted to open left hemicolectomy   -continue low residue diet  -Multi-modal pain control - allergy to morphine and dilaudid noted   -continue to mobilize  -Encourage IS use   -WBC normal  Likely reactive postop   -Restart home PO medications  -Pepcid, lovenox   -continue to replace potassium and magnesium with oral supplementation   Okay to discharge home the patient feels comfortable with minimal level of nausea        Tamra Harry MD  General Surgery  [facility]

## 2023-05-02 NOTE — ASSESSMENT & PLAN NOTE
POD #4 s/p lap converted to open left hemicolectomy   -continue low residue diet  -Multi-modal pain control - allergy to morphine and dilaudid noted   -continue to mobilize  -Encourage IS use   -WBC normal  Likely reactive postop   -Restart home PO medications  -Pepcid, lovenox   -continue to replace potassium and magnesium with oral supplementation   Okay to discharge home the patient feels comfortable with minimal level of nausea

## 2023-05-02 NOTE — SUBJECTIVE & OBJECTIVE
Interval History:  Tolerating low residue diet.  Minimal pain.  Ambulating.  Very mild nausea but no vomiting.  Concerned about swelling in her lower abdomen and labia    Medications:  Continuous Infusions:  Scheduled Meds:  PRN Meds:     Review of patient's allergies indicates:   Allergen Reactions    Avelox [moxifloxacin] Hives    Dilaudid [hydromorphone] Other (See Comments)     Intolerance but not sure    Loniten [minoxidil] Hives    Quinolones Hives     Objective:     Vital Signs (Most Recent):  Temp: 98.3 °F (36.8 °C) (05/01/23 1108)  Pulse: 104 (05/01/23 1108)  Resp: 20 (05/01/23 1108)  BP: (!) 113/56 (05/01/23 1108)  SpO2: 99 % (05/01/23 1108)   Vital Signs (24h Range):        Weight: 55 kg (121 lb 3.2 oz)  Body mass index is 21.47 kg/m².    Intake/Output - Last 3 Shifts         04/30 0700  05/01 0659 05/01 0700  05/02 0659 05/02 0700  05/03 0659    P.O. 1040      I.V. (mL/kg)       IV Piggyback       Total Intake(mL/kg) 1040 (18.9)      Urine (mL/kg/hr) 900 (0.7)      Stool 0      Total Output 900      Net +140             Urine Occurrence 7 x      Stool Occurrence 3 x              Physical Exam  Vitals and nursing note reviewed.   Constitutional:       General: She is not in acute distress.     Appearance: She is not ill-appearing or toxic-appearing.   Cardiovascular:      Rate and Rhythm: Normal rate and regular rhythm.   Pulmonary:      Effort: Pulmonary effort is normal. No respiratory distress.   Abdominal:      General: There is no distension.      Palpations: Abdomen is soft.      Tenderness: There is abdominal tenderness (appropriately TTP around midline). There is no guarding.      Hernia: No hernia is present.      Comments: Midline incision with dressing in place    Skin:     General: Skin is warm and dry.      Comments: Swelling in her labia and lower abdomen consistent with 3rd space fluid   Neurological:      Mental Status: She is alert. Mental status is at baseline.       Significant Labs:  I  have reviewed all pertinent lab results within the past 24 hours.    Significant Diagnostics:  I have reviewed all pertinent imaging results/findings within the past 24 hours.

## 2023-05-03 ENCOUNTER — NURSE TRIAGE (OUTPATIENT)
Dept: ADMINISTRATIVE | Facility: CLINIC | Age: 53
End: 2023-05-03
Payer: MEDICAID

## 2023-05-04 ENCOUNTER — HOSPITAL ENCOUNTER (EMERGENCY)
Facility: HOSPITAL | Age: 53
Discharge: HOME | End: 2023-05-04
Attending: EMERGENCY MEDICINE
Payer: MEDICAID

## 2023-05-04 ENCOUNTER — OFFICE VISIT (OUTPATIENT)
Dept: SURGERY | Facility: CLINIC | Age: 53
End: 2023-05-04
Payer: MEDICAID

## 2023-05-04 VITALS
BODY MASS INDEX: 22.54 KG/M2 | SYSTOLIC BLOOD PRESSURE: 132 MMHG | TEMPERATURE: 98 F | OXYGEN SATURATION: 100 % | WEIGHT: 127.19 LBS | RESPIRATION RATE: 16 BRPM | DIASTOLIC BLOOD PRESSURE: 78 MMHG | HEIGHT: 63 IN | HEART RATE: 98 BPM

## 2023-05-04 VITALS
BODY MASS INDEX: 22.12 KG/M2 | HEART RATE: 92 BPM | HEIGHT: 63 IN | OXYGEN SATURATION: 99 % | WEIGHT: 124.88 LBS | DIASTOLIC BLOOD PRESSURE: 55 MMHG | SYSTOLIC BLOOD PRESSURE: 101 MMHG

## 2023-05-04 DIAGNOSIS — L53.9 ERYTHEMA OF SKIN: Primary | ICD-10-CM

## 2023-05-04 DIAGNOSIS — C18.9 ADENOCARCINOMA OF COLON: Primary | ICD-10-CM

## 2023-05-04 LAB
BASOPHILS # BLD AUTO: 0.03 K/UL (ref 0–0.2)
BASOPHILS NFR BLD: 0.3 % (ref 0–1.9)
DIFFERENTIAL METHOD: ABNORMAL
EOSINOPHIL # BLD AUTO: 0.5 K/UL (ref 0–0.5)
EOSINOPHIL NFR BLD: 6.2 % (ref 0–8)
ERYTHROCYTE [DISTWIDTH] IN BLOOD BY AUTOMATED COUNT: 13.6 % (ref 11.5–14.5)
HCT VFR BLD AUTO: 27.5 % (ref 37–48.5)
HGB BLD-MCNC: 8.7 G/DL (ref 12–16)
IMM GRANULOCYTES # BLD AUTO: 0.06 K/UL (ref 0–0.04)
IMM GRANULOCYTES NFR BLD AUTO: 0.7 % (ref 0–0.5)
LYMPHOCYTES # BLD AUTO: 1.3 K/UL (ref 1–4.8)
LYMPHOCYTES NFR BLD: 15.2 % (ref 18–48)
MCH RBC QN AUTO: 26.7 PG (ref 27–31)
MCHC RBC AUTO-ENTMCNC: 31.6 G/DL (ref 32–36)
MCV RBC AUTO: 84 FL (ref 82–98)
MONOCYTES # BLD AUTO: 0.9 K/UL (ref 0.3–1)
MONOCYTES NFR BLD: 9.8 % (ref 4–15)
NEUTROPHILS # BLD AUTO: 5.9 K/UL (ref 1.8–7.7)
NEUTROPHILS NFR BLD: 67.8 % (ref 38–73)
NRBC BLD-RTO: 0 /100 WBC
PLATELET # BLD AUTO: 465 K/UL (ref 150–450)
PMV BLD AUTO: 8.7 FL (ref 9.2–12.9)
RBC # BLD AUTO: 3.26 M/UL (ref 4–5.4)
WBC # BLD AUTO: 8.68 K/UL (ref 3.9–12.7)

## 2023-05-04 PROCEDURE — 99213 OFFICE O/P EST LOW 20 MIN: CPT | Mod: PBBFAC,27 | Performed by: STUDENT IN AN ORGANIZED HEALTH CARE EDUCATION/TRAINING PROGRAM

## 2023-05-04 PROCEDURE — 99283 EMERGENCY DEPT VISIT LOW MDM: CPT

## 2023-05-04 PROCEDURE — 3008F PR BODY MASS INDEX (BMI) DOCUMENTED: ICD-10-PCS | Mod: CPTII,,, | Performed by: STUDENT IN AN ORGANIZED HEALTH CARE EDUCATION/TRAINING PROGRAM

## 2023-05-04 PROCEDURE — 99999 PR PBB SHADOW E&M-EST. PATIENT-LVL III: CPT | Mod: PBBFAC,,, | Performed by: STUDENT IN AN ORGANIZED HEALTH CARE EDUCATION/TRAINING PROGRAM

## 2023-05-04 PROCEDURE — 99999 PR PBB SHADOW E&M-EST. PATIENT-LVL III: ICD-10-PCS | Mod: PBBFAC,,, | Performed by: STUDENT IN AN ORGANIZED HEALTH CARE EDUCATION/TRAINING PROGRAM

## 2023-05-04 PROCEDURE — 36415 COLL VENOUS BLD VENIPUNCTURE: CPT | Performed by: EMERGENCY MEDICINE

## 2023-05-04 PROCEDURE — 85025 COMPLETE CBC W/AUTO DIFF WBC: CPT | Performed by: EMERGENCY MEDICINE

## 2023-05-04 PROCEDURE — 99024 POSTOP FOLLOW-UP VISIT: CPT | Mod: ,,, | Performed by: STUDENT IN AN ORGANIZED HEALTH CARE EDUCATION/TRAINING PROGRAM

## 2023-05-04 PROCEDURE — 3008F BODY MASS INDEX DOCD: CPT | Mod: CPTII,,, | Performed by: STUDENT IN AN ORGANIZED HEALTH CARE EDUCATION/TRAINING PROGRAM

## 2023-05-04 PROCEDURE — 3078F PR MOST RECENT DIASTOLIC BLOOD PRESSURE < 80 MM HG: ICD-10-PCS | Mod: CPTII,,, | Performed by: STUDENT IN AN ORGANIZED HEALTH CARE EDUCATION/TRAINING PROGRAM

## 2023-05-04 PROCEDURE — 3078F DIAST BP <80 MM HG: CPT | Mod: CPTII,,, | Performed by: STUDENT IN AN ORGANIZED HEALTH CARE EDUCATION/TRAINING PROGRAM

## 2023-05-04 PROCEDURE — 99024 PR POST-OP FOLLOW-UP VISIT: ICD-10-PCS | Mod: ,,, | Performed by: STUDENT IN AN ORGANIZED HEALTH CARE EDUCATION/TRAINING PROGRAM

## 2023-05-04 PROCEDURE — 3074F PR MOST RECENT SYSTOLIC BLOOD PRESSURE < 130 MM HG: ICD-10-PCS | Mod: CPTII,,, | Performed by: STUDENT IN AN ORGANIZED HEALTH CARE EDUCATION/TRAINING PROGRAM

## 2023-05-04 PROCEDURE — 3074F SYST BP LT 130 MM HG: CPT | Mod: CPTII,,, | Performed by: STUDENT IN AN ORGANIZED HEALTH CARE EDUCATION/TRAINING PROGRAM

## 2023-05-04 NOTE — TELEPHONE ENCOUNTER
Patient states that she noticed an area over her right hip is red and blanches to the touch. She feels like the area is warm. Denies drainage, swelling, fever, pain, and itching.  Oral temperature is 98.3 degrees. She states the red area is about the size of her hand and there may be a small bruise around the top. Care advice is home care. Instructed to call back if symptoms worsen.     Reason for Disposition   Mild localized rash    Additional Information   Negative: [1] Sudden onset of rash (within last 2 hours) AND [2] difficulty breathing or swallowing   Negative: Sounds like a life-threatening emergency to the triager   Negative: Localized lump (or swelling) without redness or rash   Negative: [1] Localized purple or blood-colored spots or dots AND [2] not from injury or friction AND [3] fever   Negative: Patient sounds very sick or weak to the triager   Negative: [1] Red area or streak AND [2] fever   Negative: [1] Rash is painful to touch AND [2] fever   Negative: [1] Looks infected (spreading redness, pus) AND [2] large red area (> 2 in. or 5 cm)   Negative: [1] Looks infected (spreading redness, pus) AND [2] diabetes mellitus or weak immune system (e.g., HIV positive, cancer chemo, splenectomy, organ transplant, chronic steroids)   Negative: [1] Localized purple or blood-colored spots or dots AND [2] not from injury or friction AND [3] no fever   Negative: [1] Looks infected (spreading redness, pus) AND [2] no fever   Negative: Looks like a boil, infected sore, deep ulcer or other infected rash   Negative: [1] Localized rash is very painful AND [2] no fever   Negative: Genital area rash   Negative: Lyme disease suspected (e.g., bull's eye rash or tick bite / exposure)   Negative: [1] Applying cream or ointment AND [2] causes severe itch, burning or pain   Negative: Medication patch causing local rash or itching   Negative: [1] Pimples (localized) AND [2 ] no improvement after using Care Advice   Negative:  Tender bumps in armpits   Negative: [1] Severe localized itching AND [2] after 2 days of steroid cream   Negative: Localized rash present > 7 days   Negative: Red, moist, irritated area between skin folds (or under larger breasts)   Negative: [1] Localized area of skin darkening or thickening on lower legs or ankles AND [2] has NOT been evaluated by a doctor (or NP/PA)    Protocols used: Rash or Redness - [id number]

## 2023-05-04 NOTE — ED PROVIDER NOTES
Encounter Date: 5/4/2023       History     Chief Complaint   Patient presents with    skin redness     Pt reports she was taking a shower PTA and noticed skin reddening to right hip region. Denies any pain or itching.      53 yo female with recent hemicolectomy here via POV with complaint of erythematous skin to R flank noted while showering just PTA. Not painful. No fever/chills. No trauma. Not itching. Gradual onset. No prior similar.     Review of patient's allergies indicates:   Allergen Reactions    Avelox [moxifloxacin] Hives    Dilaudid [hydromorphone] Other (See Comments)     Intolerance but not sure    Loniten [minoxidil] Hives    Quinolones Hives     Past Medical History:   Diagnosis Date    Blood in stool 01/2023    Blood in urine     Colonic mass 04/2023    Hair loss     Herpes     History of chest pain     Mass of colon     Wears contact lenses     LEFT     Past Surgical History:   Procedure Laterality Date    COLONOSCOPY N/A 4/26/2023    Procedure: COLONOSCOPY WITH ENDOSCOPIC TATTOOING;  Surgeon: Yarelis Paez MD;  Location: [facility] OR;  Service: General;  Laterality: N/A;    COLONOSCOPY, WITH 1 OR MORE BIOPSIES N/A 3/29/2023    Procedure: COLONOSCOPY, WITH 1 OR MORE BIOPSIES WITH ENDOSCOPIC TATTOO;  Surgeon: Yarelis Paez MD;  Location: [facility] ENDO;  Service: General;  Laterality: N/A;  2nd 0600    FLEXIBLE SIGMOIDOSCOPY N/A 1/25/2023    Procedure: Flex Sig;  Surgeon: Yarelis Paez MD;  Location: [facility] ENDO;  Service: General;  Laterality: N/A;  #2 0600    LAPAROSCOPIC HEMICOLECTOMY Left 4/26/2023    Procedure: HEMICOLECTOMY, LAPAROSCOPIC, LEFT CONVERTED TO OPEN;  Surgeon: Yarelis Paez MD;  Location: [facility] OR;  Service: General;  Laterality: Left;  2nd - 0600  wants to do colonoscopy 1st    TUBAL LIGATION       Family History   Problem Relation Age of Onset    Heart failure Mother 66    Cancer Father     Hypertension Father     No Known Problems Sister     No Known Problems Sister         54     Cancer Sister 35    No Known Problems Brother     No Known Problems Brother     Cancer Paternal Grandfather      Social History     Tobacco Use    Smoking status: Never     Passive exposure: Never   Substance Use Topics    Alcohol use: Never    Drug use: Never     Review of Systems   Constitutional: Negative.    Respiratory: Negative.     Cardiovascular: Negative.    Gastrointestinal: Negative.    All other systems reviewed and are negative.    Physical Exam     Initial Vitals [05/04/23 0028]   BP Pulse Resp Temp SpO2   (!) 142/63 98 16 98.3 °F (36.8 °C) 100 %      MAP       --         Physical Exam    Nursing note and vitals reviewed.  Constitutional: She appears well-developed and well-nourished. She is not diaphoretic. No distress.   HENT:   Head: Normocephalic and atraumatic.   Eyes: EOM are normal. Pupils are equal, round, and reactive to light.   Neck: Neck supple.   Normal range of motion.  Cardiovascular:  Normal rate, regular rhythm and normal heart sounds.     Exam reveals no gallop and no friction rub.       No murmur heard.  Pulmonary/Chest: Breath sounds normal. No respiratory distress. She has no wheezes. She has no rales.   Abdominal: Abdomen is soft. Bowel sounds are normal. She exhibits no distension. There is no abdominal tenderness. There is no rebound.   Musculoskeletal:         General: No tenderness or edema. Normal range of motion.      Cervical back: Normal range of motion and neck supple.     Neurological: She is alert. She has normal strength and normal reflexes.   Skin: Skin is warm and dry. Capillary refill takes less than 2 seconds. There is erythema (nontender warm area right flank, blanchable).       ED Course   Procedures  Labs Reviewed   CBC W/ AUTO DIFFERENTIAL - Abnormal; Notable for the following components:       Result Value    RBC 3.26 (*)     Hemoglobin 8.7 (*)     Hematocrit 27.5 (*)     MCH 26.7 (*)     MCHC 31.6 (*)     Platelets 465 (*)     MPV 8.7 (*)     Immature  Granulocytes 0.7 (*)     Immature Grans (Abs) 0.06 (*)     Lymph % 15.2 (*)     All other components within normal limits          Imaging Results    None          Medications - No data to display  Medical Decision Making:   Clinical Tests:   Lab Tests: Ordered and Reviewed  ED Management:  Has surgery follow up tomorrow. Doubt allergic rxn. Doubt cellulitis.                         Clinical Impression:   Final diagnoses:  [L53.9] Erythema of skin (Primary)        ED Disposition Condition    Discharge Stable          ED Prescriptions    None       Follow-up Information       Follow up With Specialties Details Why Contact Info    Maya Klein MD Internal Medicine Schedule an appointment as soon as possible for a visit   [address] [address]  [phone]      Yarelis Paez MD General Surgery Schedule an appointment as soon as possible for a visit   [address] [address]  [phone]               Kory Stephen MD  05/08/23 06

## 2023-05-05 NOTE — PROGRESS NOTES
[facility]  General Surgery Clinic Progress Note    HPI:  Caridad Hope is a 52 y.o. female with history of left colon mass s/p left hemicolectomy who presents for follow up.  Tolerating soft diet without nausea or vomiting.  Having daily soft bowel movements.  Reports continued weakness, but is slowly increasing activity levels.      ROS:  Negative except above    PE:  Vitals:    05/04/23 1440   BP: (!) 101/55   Pulse: 92       Gen: Alert and oriented x3, judgement intact, NAD  CV: RRR  Resp: CTAB; breaths non-labored and symmetrical  Abd: Soft, NT, ND, BS+;  Midline incision C/D/I with staples in place.  No SOI  Extremities: No c/c/e    Pathology:            A/P:  52 y.o. female who presents with [id number] adenocarcinoma of the colon  Amb referral to oncology for evaluation for adjuvant therapy  -Pre-op CT abd/pelvis/chest negative for obvious distal metastasis  -Pre-op CEA wnl   -RTC 1 week for staple removal  -  -  -    Yarelis Paez MD  General Surgery   [phone]        5/5/2023  10:27 AM

## 2023-05-08 LAB — SPECIMEN TO PATHOLOGY - SURGICAL: NORMAL

## 2023-05-12 DIAGNOSIS — C18.9 ADENOCARCINOMA OF COLON: Primary | ICD-10-CM

## 2023-05-15 ENCOUNTER — OFFICE VISIT (OUTPATIENT)
Dept: SURGERY | Facility: CLINIC | Age: 53
End: 2023-05-15
Payer: MEDICAID

## 2023-05-15 VITALS
BODY MASS INDEX: 21.97 KG/M2 | HEIGHT: 63 IN | SYSTOLIC BLOOD PRESSURE: 114 MMHG | DIASTOLIC BLOOD PRESSURE: 58 MMHG | WEIGHT: 124 LBS | OXYGEN SATURATION: 99 % | HEART RATE: 90 BPM

## 2023-05-15 DIAGNOSIS — C18.9 ADENOCARCINOMA OF COLON: Primary | ICD-10-CM

## 2023-05-15 PROCEDURE — 99024 PR POST-OP FOLLOW-UP VISIT: ICD-10-PCS | Mod: ,,, | Performed by: STUDENT IN AN ORGANIZED HEALTH CARE EDUCATION/TRAINING PROGRAM

## 2023-05-15 PROCEDURE — 3074F PR MOST RECENT SYSTOLIC BLOOD PRESSURE < 130 MM HG: ICD-10-PCS | Mod: CPTII,,, | Performed by: STUDENT IN AN ORGANIZED HEALTH CARE EDUCATION/TRAINING PROGRAM

## 2023-05-15 PROCEDURE — 3008F PR BODY MASS INDEX (BMI) DOCUMENTED: ICD-10-PCS | Mod: CPTII,,, | Performed by: STUDENT IN AN ORGANIZED HEALTH CARE EDUCATION/TRAINING PROGRAM

## 2023-05-15 PROCEDURE — 99024 POSTOP FOLLOW-UP VISIT: CPT | Mod: ,,, | Performed by: STUDENT IN AN ORGANIZED HEALTH CARE EDUCATION/TRAINING PROGRAM

## 2023-05-15 PROCEDURE — 3008F BODY MASS INDEX DOCD: CPT | Mod: CPTII,,, | Performed by: STUDENT IN AN ORGANIZED HEALTH CARE EDUCATION/TRAINING PROGRAM

## 2023-05-15 PROCEDURE — 3074F SYST BP LT 130 MM HG: CPT | Mod: CPTII,,, | Performed by: STUDENT IN AN ORGANIZED HEALTH CARE EDUCATION/TRAINING PROGRAM

## 2023-05-15 PROCEDURE — 99999 PR PBB SHADOW E&M-EST. PATIENT-LVL III: CPT | Mod: PBBFAC,,, | Performed by: STUDENT IN AN ORGANIZED HEALTH CARE EDUCATION/TRAINING PROGRAM

## 2023-05-15 PROCEDURE — 3078F PR MOST RECENT DIASTOLIC BLOOD PRESSURE < 80 MM HG: ICD-10-PCS | Mod: CPTII,,, | Performed by: STUDENT IN AN ORGANIZED HEALTH CARE EDUCATION/TRAINING PROGRAM

## 2023-05-15 PROCEDURE — 99213 OFFICE O/P EST LOW 20 MIN: CPT | Mod: PBBFAC | Performed by: STUDENT IN AN ORGANIZED HEALTH CARE EDUCATION/TRAINING PROGRAM

## 2023-05-15 PROCEDURE — 3078F DIAST BP <80 MM HG: CPT | Mod: CPTII,,, | Performed by: STUDENT IN AN ORGANIZED HEALTH CARE EDUCATION/TRAINING PROGRAM

## 2023-05-15 PROCEDURE — 99999 PR PBB SHADOW E&M-EST. PATIENT-LVL III: ICD-10-PCS | Mod: PBBFAC,,, | Performed by: STUDENT IN AN ORGANIZED HEALTH CARE EDUCATION/TRAINING PROGRAM

## 2023-05-16 NOTE — HOSPITAL COURSE
Underwent lap converted to open left hemicolectomy on 4/26.  Transferred to med surg floor post op in stable condition.  Pt with post-op leukocytosis which resolved on POD #3 without fevers.  Return of bowel function on POD #3 and diet advanced.  On POD #5, patient tolerating soft diet with pain controlled with PO medication and deemed eligible for discharge.

## 2023-05-16 NOTE — DISCHARGE SUMMARY
[facility]  General Surgery  Discharge Summary      Patient Name: Caridad Hope  MRN: [id number]  Admission Date: 4/26/2023  Hospital Length of Stay: 5 days  Discharge Date and Time: 5/1/2023 12:20 PM  Attending Physician: No att. providers found   Discharging Provider: Yarelis Paez MD  Primary Care Provider: Maya Klein MD    HPI:   51yo female with history of left colon mass presenting for left hemicolectomy.       Procedure(s) (LRB):  HEMICOLECTOMY, LAPAROSCOPIC, LEFT CONVERTED TO OPEN (Left)  COLONOSCOPY WITH ENDOSCOPIC TATTOOING (N/A)      Indwelling Lines/Drains at time of discharge:   Lines/Drains/Airways     None               Hospital Course: Underwent lap converted to open left hemicolectomy on 4/26.  Transferred to [facility] floor post op in stable condition.  Pt with post-op leukocytosis which resolved on POD #3 without fevers.  Return of bowel function on POD #3 and diet advanced.  On POD #5, patient tolerating soft diet with pain controlled with PO medication and deemed eligible for discharge.       Goals of Care Treatment Preferences:  Code Status: Full Code      Consults:     Significant Diagnostic Studies: see above    Pending Diagnostic Studies:     None        Final Active Diagnoses:    Diagnosis Date Noted POA    PRINCIPAL PROBLEM:  Colonic mass [K63.89] 03/29/2023 Yes      Problems Resolved During this Admission:      Discharged Condition: good    Disposition: Home or Self Care    Follow Up:    Patient Instructions:      Diet Adult Regular     Lifting restrictions   Order Comments: No lifting, pushing, or pulling greater than 10lbs for 6 weeks to reduce risk of hernia   You may return to work in one week if you are able to adhere to the lifting restriction above  If you are unable to perform your regular duties with the restrictions, please contact Dr. Paez's office     Notify your health care provider if you experience any of the following:  temperature >100.4      Notify your health care provider if you experience any of the following:  persistent nausea and vomiting or diarrhea     Notify your health care provider if you experience any of the following:  severe uncontrolled pain     Notify your health care provider if you experience any of the following:  redness, tenderness, or signs of infection (pain, swelling, redness, odor or green/yellow discharge around incision site)     No dressing needed   Order Comments: Shower daily and allow soapy water to run over incisions  Staples will be removed by Dr. Paez in clinic   Do not scrub or submerge in water for two weeks     Activity as tolerated     Medications:  Reconciled Home Medications:      Medication List      START taking these medications    famotidine 20 MG tablet  Commonly known as: PEPCID  Take 1 tablet (20 mg total) by mouth 2 (two) times daily.        CONTINUE taking these medications    finasteride 5 mg tablet  Commonly known as: PROSCAR  Take 5 mg by mouth once daily.     oxybutynin 5 MG Tab  Commonly known as: DITROPAN  Take 1 tablet (5 mg total) by mouth 3 (three) times daily.     valACYclovir 500 MG tablet  Commonly known as: VALTREX  Take 500 mg by mouth once daily.        STOP taking these medications    methocarbamoL 750 MG Tab  Commonly known as: ROBAXIN        ASK your doctor about these medications    HYDROcodone-acetaminophen  mg per tablet  Commonly known as: NORCO  Take 1 tablet by mouth every 6 (six) hours as needed for Pain.  Ask about: Should I take this medication?     methocarbamoL 500 MG Tab  Commonly known as: ROBAXIN  Take 2 tablets (1,000 mg total) by mouth 4 (four) times daily. for 10 days  Ask about: Should I take this medication?     ondansetron 4 MG tablet  Commonly known as: ZOFRAN  Take 1 tablet (4 mg total) by mouth every 6 (six) hours as needed for Nausea.  Ask about: Should I take this medication?          Time spent on the discharge of patient: 10 minutes    Yarelis  MD Jeannine  General Surgery  [facility]

## 2023-05-19 PROBLEM — C18.9 ADENOCARCINOMA OF COLON: Status: ACTIVE | Noted: 2023-05-19

## 2023-05-22 ENCOUNTER — TELEPHONE (OUTPATIENT)
Dept: PHARMACY | Facility: CLINIC | Age: 53
End: 2023-05-22
Payer: MEDICAID

## 2023-05-22 ENCOUNTER — SPECIALTY PHARMACY (OUTPATIENT)
Dept: PHARMACY | Facility: CLINIC | Age: 53
End: 2023-05-22
Payer: MEDICAID

## 2023-05-22 NOTE — TELEPHONE ENCOUNTER
Latanya, this is Ximena Wilson, clinical pharmacist with [facility] that is part of your care team.  We have begun working on your prescription that your doctor has sent us. Our next steps include:     Working with your insurance company to obtain approval for your medication  Working with you to ensure your medication is affordable     We will be calling you along the way with updates on your medication but if you have any concerns or receive information that you would like to discuss please reach us at [phone].    Welcome call outcome: Patient/caregiver reached

## 2023-05-28 NOTE — PROGRESS NOTES
[facility]  General Surgery Clinic Progress Note    HPI:  Caridad Hope is a 52 y.o. female with history of left colon mass s/p left hemicolectomy who presents for follow up.  Tolerating regular diet without nausea or vomiting.  Slowly regaining energy.  Voices no complaints.     ROS:  Negative except above    PE:  Vitals:    05/15/23 1323   BP: (!) 114/58   Pulse: 90       Gen: Alert and oriented x3, judgement intact, NAD  CV: RRR  Resp: CTAB; breaths non-labored and symmetrical  Abd: Soft, NT, ND, BS+;  Midline incision C/D/I with staples in place.  No SOI  Extremities: No c/c/e    Pathology:            A/P:  52 y.o. female who presents with [id number] adenocarcinoma of the colon  Amb referral to oncology for evaluation for adjuvant therapy  -Staples removed in clinic  -scheduled for oncology on 5/17  -RTC 3 months unless patient in need of PAC     Yarelis Paez MD  General Surgery   [phone]        5/28/2023  10:27 AM

## 2023-06-02 ENCOUNTER — SPECIALTY PHARMACY (OUTPATIENT)
Dept: PHARMACY | Facility: CLINIC | Age: 53
End: 2023-06-02
Payer: MEDICAID

## 2023-06-02 RX ORDER — ONDANSETRON HYDROCHLORIDE 8 MG/1
8 TABLET, FILM COATED ORAL 2 TIMES DAILY
COMMUNITY
End: 2023-06-14

## 2023-06-02 RX ORDER — CHOLECALCIFEROL (VITAMIN D3) 25 MCG
1000 TABLET ORAL DAILY
COMMUNITY

## 2023-06-03 ENCOUNTER — HOSPITAL ENCOUNTER (OUTPATIENT)
Dept: RADIOLOGY | Facility: HOSPITAL | Age: 53
Discharge: HOME | End: 2023-06-03
Attending: NURSE PRACTITIONER
Payer: MEDICAID

## 2023-06-03 VITALS — HEIGHT: 63 IN | BODY MASS INDEX: 21.62 KG/M2 | WEIGHT: 122 LBS

## 2023-06-03 DIAGNOSIS — Z12.31 ENCOUNTER FOR SCREENING MAMMOGRAM FOR MALIGNANT NEOPLASM OF BREAST: ICD-10-CM

## 2023-06-03 PROCEDURE — 77067 SCR MAMMO BI INCL CAD: CPT | Mod: TC

## 2023-06-19 ENCOUNTER — SPECIALTY PHARMACY (OUTPATIENT)
Dept: PHARMACY | Facility: CLINIC | Age: 53
End: 2023-06-19
Payer: MEDICAID

## 2023-06-19 ENCOUNTER — OFFICE VISIT (OUTPATIENT)
Dept: SURGERY | Facility: CLINIC | Age: 53
End: 2023-06-19
Payer: MEDICAID

## 2023-06-19 VITALS
BODY MASS INDEX: 21.56 KG/M2 | WEIGHT: 121.69 LBS | HEART RATE: 85 BPM | HEIGHT: 63 IN | SYSTOLIC BLOOD PRESSURE: 120 MMHG | OXYGEN SATURATION: 98 % | DIASTOLIC BLOOD PRESSURE: 55 MMHG

## 2023-06-19 DIAGNOSIS — C18.9 ADENOCARCINOMA OF COLON: Primary | ICD-10-CM

## 2023-06-19 PROCEDURE — 3078F DIAST BP <80 MM HG: CPT | Mod: CPTII,,, | Performed by: STUDENT IN AN ORGANIZED HEALTH CARE EDUCATION/TRAINING PROGRAM

## 2023-06-19 PROCEDURE — 99999 PR PBB SHADOW E&M-EST. PATIENT-LVL III: ICD-10-PCS | Mod: PBBFAC,,, | Performed by: STUDENT IN AN ORGANIZED HEALTH CARE EDUCATION/TRAINING PROGRAM

## 2023-06-19 PROCEDURE — 1159F PR MEDICATION LIST DOCUMENTED IN MEDICAL RECORD: ICD-10-PCS | Mod: CPTII,,, | Performed by: STUDENT IN AN ORGANIZED HEALTH CARE EDUCATION/TRAINING PROGRAM

## 2023-06-19 PROCEDURE — 99999 PR PBB SHADOW E&M-EST. PATIENT-LVL III: CPT | Mod: PBBFAC,,, | Performed by: STUDENT IN AN ORGANIZED HEALTH CARE EDUCATION/TRAINING PROGRAM

## 2023-06-19 PROCEDURE — 99024 POSTOP FOLLOW-UP VISIT: CPT | Mod: ,,, | Performed by: STUDENT IN AN ORGANIZED HEALTH CARE EDUCATION/TRAINING PROGRAM

## 2023-06-19 PROCEDURE — 99024 PR POST-OP FOLLOW-UP VISIT: ICD-10-PCS | Mod: ,,, | Performed by: STUDENT IN AN ORGANIZED HEALTH CARE EDUCATION/TRAINING PROGRAM

## 2023-06-19 PROCEDURE — 3008F PR BODY MASS INDEX (BMI) DOCUMENTED: ICD-10-PCS | Mod: CPTII,,, | Performed by: STUDENT IN AN ORGANIZED HEALTH CARE EDUCATION/TRAINING PROGRAM

## 2023-06-19 PROCEDURE — 1159F MED LIST DOCD IN RCRD: CPT | Mod: CPTII,,, | Performed by: STUDENT IN AN ORGANIZED HEALTH CARE EDUCATION/TRAINING PROGRAM

## 2023-06-19 PROCEDURE — 3008F BODY MASS INDEX DOCD: CPT | Mod: CPTII,,, | Performed by: STUDENT IN AN ORGANIZED HEALTH CARE EDUCATION/TRAINING PROGRAM

## 2023-06-19 PROCEDURE — 99213 OFFICE O/P EST LOW 20 MIN: CPT | Mod: PBBFAC | Performed by: STUDENT IN AN ORGANIZED HEALTH CARE EDUCATION/TRAINING PROGRAM

## 2023-06-19 PROCEDURE — 3074F PR MOST RECENT SYSTOLIC BLOOD PRESSURE < 130 MM HG: ICD-10-PCS | Mod: CPTII,,, | Performed by: STUDENT IN AN ORGANIZED HEALTH CARE EDUCATION/TRAINING PROGRAM

## 2023-06-19 PROCEDURE — 3078F PR MOST RECENT DIASTOLIC BLOOD PRESSURE < 80 MM HG: ICD-10-PCS | Mod: CPTII,,, | Performed by: STUDENT IN AN ORGANIZED HEALTH CARE EDUCATION/TRAINING PROGRAM

## 2023-06-19 PROCEDURE — 3074F SYST BP LT 130 MM HG: CPT | Mod: CPTII,,, | Performed by: STUDENT IN AN ORGANIZED HEALTH CARE EDUCATION/TRAINING PROGRAM

## 2023-06-19 NOTE — TELEPHONE ENCOUNTER
Pt opted to discontinue chemotherapy at this time. Discarded chemo at providers office. Closing out of OSP.

## 2023-07-09 NOTE — PROGRESS NOTES
"[facility]  General Surgery Clinic Progress Note    HPI:  Caridad Hope is a 52 y.o. female with Stage III CRC s/p left hemicolectomy who presents for follow up.  Tolerating regular diet with daily bowel movements.  Has decided against adjuvant chemotherapy.      ROS:  Negative except above    PE:  Vitals:    06/19/23 1105   BP: (!) 120/55   BP Location: Left arm   Patient Position: Sitting   BP Method: Medium (Automatic)   Pulse: 85   SpO2: 98%   Weight: 55.2 kg (121 lb 11.1 oz)   Height: 5' 3" (1.6 m)        Gen: Alert and oriented x3, judgement intact, NAD  CV: RRR  Resp: CTAB; breaths non-labored and symmetrical  Abd: Soft, NT, ND, BS+;  incisions well healed with no SOI  Extremities: No c/c/e    A/P:  52 y.o. female with Stage III CRC s/p left hemicolectomy who presents for follow up  -patient expressed desire to NOT pursue adjuvant chemotherapy  Aggressive surveillance schedule discussed with patient which includes:  -H&P & CEA every 3-6 mos for 2 years, then every 6 mos for 5 years total. CT CAP every 6-12 months for 5 years  -discussed risks of recurrence if chemotherapy not pursued and and that recurrence may not be treatable at the time of detection.  -patient voiced understanding  -return to clinic in August for 6mo H&P and CEA    Yarelis Paez MD  General Surgery   [phone]        7/9/2023  6:21 PM      "

## 2023-08-11 NOTE — PROGRESS NOTES
"                                                    Physical Therapy Daily Note     Name: Caridad [facility] Number: [id number]  Diagnosis:   Encounter Diagnosis   Name Primary?    Cervical radiculopathy Yes     Physician: Daquan Ponce Jr., *  Precautions: none  Visit #: 4 of 12  PTA Visit #: 0  Time In: 0803  Time Out: 0856    Subjective     Pt reports: Minimal complaints of neck/upper back pain today.   Pain Scale: Caridad rates pain on a scale of 0-10 to be 2 currently.     Objective     Caridad received individual therapeutic exercises to develop strength, endurance, ROM, flexibility, posture, and core stabilization for 53 minutes including:  All BOLD exercises performed today:   (B) UE bicycle 4 minutes forward and 4 minutes backwards.   (B) UE omnicycle Ortho 4W 10 minutes total 5/5.   3x10 Cervical ROM flex/ext/lat flex/rot  3x10 RTB Horizontal Abd  3x10 RTB ROW High/Low  3x10 5" wall protraction  3x10 Lat pulls RTB  3x10 Posterior shoulder rolls  20 x 5" Protraction Stretch  4x30" UT/Levator  3x10 Scapular Retraction  30x5" Chin Tucks  X10 3 directions YB    Caridad received the following manual therapy techniques: Manual Soft tissue Mobilization were applied to the: cervical paraspinals, suboccipitals and upper trap for 8 minutes.     The patient received the following direct contact modalities after being cleared for contraindications: none    The patient received the following supervised modalities after being cleared for contradictions: none    Written Home Exercises Provided: see  for details.   Pt demo good understanding of the education provided. Caridad demonstrated good return demonstration of activities.     Education provided re: exercise technique, modalities, manual therapy  Caridad verbalized good understanding of education provided.   No spiritual or educational barriers to learning provided    Assessment     Patient tolerated treatment well. PT will continue to " progress patient per POC. Patient with soreness and back discomfort during exercises and patient cued on core stabilization and proper technique while performing exercises/stretches. Patient responds well to manual therapy following exercise performance. Will continue to see patient through end of current POC and re-assess as appropriate.     This is a 52 y.o. female referred to outpatient physical therapy and presents with a medical diagnosis of   Encounter Diagnosis   Name Primary?    Cervical radiculopathy Yes    and demonstrates limitations as described in the problem list. Pt prognosis is Good. Pt will continue to benefit from skilled outpatient physical therapy to address the deficits listed in the problem list, provide pt/family education and to maximize pt's level of independence in the home and community environment.     Goals as follows:  Short Term GOALS: 3 weeks. Pt agrees with goals set.  1. Patient demonstrates independence with HEP. Met CB 3/8/2023  2. Patient demonstrates independence with Postural Awareness. Met CB 3/8/2023  3. Patient demonstrates independence with body mechanics. Met CB 3/8/2023  4. Patient reports decreased complaints of neck pain to 3/10 at worse. Progress CB 3/032653     Long Term GOALS: 6 weeks. Pt agrees with goals set.  1. Patient demonstrates increased cervical ROM to improve tolerance to functional activities. Progress CB 3/8/2023  2. Patient demonstrates performance of daily activities with minimal to no complaints.Progress CB 3/8/2023  3. Patient demonstrates improved overall function per NDI to 10% or less. Not assessed CB 3/8/2023  4. Patient reports decreased complaints of scapular pain symptoms. Progress CB 3/8/2023  5. Patient reports decreased frequency, intensity and duration of headaches related to neck pain symptoms. Progress CB 3/8/2023      Plan     Continue with established Plan of Care towards PT goals.    Therapist: Clarice Malcolm, PT  3/14/2023     4 = No assist / stand by assistance

## 2023-08-17 ENCOUNTER — OFFICE VISIT (OUTPATIENT)
Dept: SURGERY | Facility: CLINIC | Age: 53
End: 2023-08-17
Payer: MEDICAID

## 2023-08-17 VITALS
DIASTOLIC BLOOD PRESSURE: 55 MMHG | OXYGEN SATURATION: 98 % | SYSTOLIC BLOOD PRESSURE: 108 MMHG | BODY MASS INDEX: 22.27 KG/M2 | TEMPERATURE: 97 F | HEIGHT: 63 IN | WEIGHT: 125.69 LBS | HEART RATE: 88 BPM

## 2023-08-17 DIAGNOSIS — C18.9 ADENOCARCINOMA OF COLON: Primary | ICD-10-CM

## 2023-08-17 PROCEDURE — 3078F PR MOST RECENT DIASTOLIC BLOOD PRESSURE < 80 MM HG: ICD-10-PCS | Mod: CPTII,,, | Performed by: STUDENT IN AN ORGANIZED HEALTH CARE EDUCATION/TRAINING PROGRAM

## 2023-08-17 PROCEDURE — 99213 OFFICE O/P EST LOW 20 MIN: CPT | Mod: S$PBB,,, | Performed by: STUDENT IN AN ORGANIZED HEALTH CARE EDUCATION/TRAINING PROGRAM

## 2023-08-17 PROCEDURE — 3078F DIAST BP <80 MM HG: CPT | Mod: CPTII,,, | Performed by: STUDENT IN AN ORGANIZED HEALTH CARE EDUCATION/TRAINING PROGRAM

## 2023-08-17 PROCEDURE — 99213 PR OFFICE/OUTPT VISIT, EST, LEVL III, 20-29 MIN: ICD-10-PCS | Mod: S$PBB,,, | Performed by: STUDENT IN AN ORGANIZED HEALTH CARE EDUCATION/TRAINING PROGRAM

## 2023-08-17 PROCEDURE — 99999 PR PBB SHADOW E&M-EST. PATIENT-LVL III: ICD-10-PCS | Mod: PBBFAC,,, | Performed by: STUDENT IN AN ORGANIZED HEALTH CARE EDUCATION/TRAINING PROGRAM

## 2023-08-17 PROCEDURE — 99999 PR PBB SHADOW E&M-EST. PATIENT-LVL III: CPT | Mod: PBBFAC,,, | Performed by: STUDENT IN AN ORGANIZED HEALTH CARE EDUCATION/TRAINING PROGRAM

## 2023-08-17 PROCEDURE — 3008F BODY MASS INDEX DOCD: CPT | Mod: CPTII,,, | Performed by: STUDENT IN AN ORGANIZED HEALTH CARE EDUCATION/TRAINING PROGRAM

## 2023-08-17 PROCEDURE — 3074F PR MOST RECENT SYSTOLIC BLOOD PRESSURE < 130 MM HG: ICD-10-PCS | Mod: CPTII,,, | Performed by: STUDENT IN AN ORGANIZED HEALTH CARE EDUCATION/TRAINING PROGRAM

## 2023-08-17 PROCEDURE — 1159F MED LIST DOCD IN RCRD: CPT | Mod: CPTII,,, | Performed by: STUDENT IN AN ORGANIZED HEALTH CARE EDUCATION/TRAINING PROGRAM

## 2023-08-17 PROCEDURE — 1159F PR MEDICATION LIST DOCUMENTED IN MEDICAL RECORD: ICD-10-PCS | Mod: CPTII,,, | Performed by: STUDENT IN AN ORGANIZED HEALTH CARE EDUCATION/TRAINING PROGRAM

## 2023-08-17 PROCEDURE — 3008F PR BODY MASS INDEX (BMI) DOCUMENTED: ICD-10-PCS | Mod: CPTII,,, | Performed by: STUDENT IN AN ORGANIZED HEALTH CARE EDUCATION/TRAINING PROGRAM

## 2023-08-17 PROCEDURE — 99213 OFFICE O/P EST LOW 20 MIN: CPT | Mod: PBBFAC | Performed by: STUDENT IN AN ORGANIZED HEALTH CARE EDUCATION/TRAINING PROGRAM

## 2023-08-17 PROCEDURE — 3074F SYST BP LT 130 MM HG: CPT | Mod: CPTII,,, | Performed by: STUDENT IN AN ORGANIZED HEALTH CARE EDUCATION/TRAINING PROGRAM

## 2023-08-21 ENCOUNTER — LAB VISIT (OUTPATIENT)
Dept: LAB | Facility: HOSPITAL | Age: 53
End: 2023-08-21
Attending: STUDENT IN AN ORGANIZED HEALTH CARE EDUCATION/TRAINING PROGRAM
Payer: MEDICAID

## 2023-08-21 DIAGNOSIS — C18.9 ADENOCARCINOMA OF COLON: ICD-10-CM

## 2023-08-21 LAB — CEA SERPL-MCNC: <1.7 NG/ML (ref 0–5)

## 2023-08-21 PROCEDURE — 82378 CARCINOEMBRYONIC ANTIGEN: CPT | Performed by: STUDENT IN AN ORGANIZED HEALTH CARE EDUCATION/TRAINING PROGRAM

## 2023-08-21 PROCEDURE — 36415 COLL VENOUS BLD VENIPUNCTURE: CPT | Performed by: STUDENT IN AN ORGANIZED HEALTH CARE EDUCATION/TRAINING PROGRAM

## 2023-08-31 NOTE — PROGRESS NOTES
"[facility]  General Surgery Clinic Progress Note    HPI:  Caridad Hope is a 53 y.o. female with Stage III CRC s/p left hemicolectomy who presents for follow up.  Tolerating regular diet with daily bowel movements.  Voices no complaints.     ROS:  Negative except above    PE:  Vitals:    08/17/23 1340   BP: (!) 108/55   BP Location: Left arm   Patient Position: Sitting   BP Method: Large (Automatic)   Pulse: 88   Temp: 96.7 °F (35.9 °C)   TempSrc: Oral   SpO2: 98%   Weight: 57 kg (125 lb 10.6 oz)   Height: 5' 3" (1.6 m)        Gen: Alert and oriented x3, judgement intact, NAD  CV: RRR  Resp: CTAB; breaths non-labored and symmetrical  Abd: Soft, NT, ND, BS+;  incisions well healed with no SOI  Extremities: No c/c/e    A/P:  53 y.o. female with Stage III CRC s/p left hemicolectomy who presents for follow up  -patient expressed desire to NOT pursue adjuvant chemotherapy  --Aggressive surveillance schedule discussed with patient which includes:  -H&P & CEA every 3-6 mos for 2 years, then every 6 mos for 5 years total. CT CAP every 6-12 months for 5 years  -CEA today  -RTC 3 months for CT CAP    Yarelis Paez MD  General Surgery   [phone]        8/30/2023  6:21 PM      "

## 2023-12-14 ENCOUNTER — OFFICE VISIT (OUTPATIENT)
Dept: SURGERY | Facility: CLINIC | Age: 53
End: 2023-12-14
Payer: MEDICAID

## 2023-12-14 VITALS
BODY MASS INDEX: 23.2 KG/M2 | WEIGHT: 130.94 LBS | HEART RATE: 78 BPM | SYSTOLIC BLOOD PRESSURE: 108 MMHG | OXYGEN SATURATION: 99 % | DIASTOLIC BLOOD PRESSURE: 67 MMHG

## 2023-12-14 DIAGNOSIS — C18.9 ADENOCARCINOMA OF COLON: Primary | ICD-10-CM

## 2023-12-14 PROCEDURE — 1159F PR MEDICATION LIST DOCUMENTED IN MEDICAL RECORD: ICD-10-PCS | Mod: CPTII,,, | Performed by: STUDENT IN AN ORGANIZED HEALTH CARE EDUCATION/TRAINING PROGRAM

## 2023-12-14 PROCEDURE — 3078F PR MOST RECENT DIASTOLIC BLOOD PRESSURE < 80 MM HG: ICD-10-PCS | Mod: CPTII,,, | Performed by: STUDENT IN AN ORGANIZED HEALTH CARE EDUCATION/TRAINING PROGRAM

## 2023-12-14 PROCEDURE — 3008F PR BODY MASS INDEX (BMI) DOCUMENTED: ICD-10-PCS | Mod: CPTII,,, | Performed by: STUDENT IN AN ORGANIZED HEALTH CARE EDUCATION/TRAINING PROGRAM

## 2023-12-14 PROCEDURE — 99999 PR PBB SHADOW E&M-EST. PATIENT-LVL III: ICD-10-PCS | Mod: PBBFAC,,, | Performed by: STUDENT IN AN ORGANIZED HEALTH CARE EDUCATION/TRAINING PROGRAM

## 2023-12-14 PROCEDURE — 99213 PR OFFICE/OUTPT VISIT, EST, LEVL III, 20-29 MIN: ICD-10-PCS | Mod: S$PBB,,, | Performed by: STUDENT IN AN ORGANIZED HEALTH CARE EDUCATION/TRAINING PROGRAM

## 2023-12-14 PROCEDURE — 3008F BODY MASS INDEX DOCD: CPT | Mod: CPTII,,, | Performed by: STUDENT IN AN ORGANIZED HEALTH CARE EDUCATION/TRAINING PROGRAM

## 2023-12-14 PROCEDURE — 1159F MED LIST DOCD IN RCRD: CPT | Mod: CPTII,,, | Performed by: STUDENT IN AN ORGANIZED HEALTH CARE EDUCATION/TRAINING PROGRAM

## 2023-12-14 PROCEDURE — 3074F SYST BP LT 130 MM HG: CPT | Mod: CPTII,,, | Performed by: STUDENT IN AN ORGANIZED HEALTH CARE EDUCATION/TRAINING PROGRAM

## 2023-12-14 PROCEDURE — 3074F PR MOST RECENT SYSTOLIC BLOOD PRESSURE < 130 MM HG: ICD-10-PCS | Mod: CPTII,,, | Performed by: STUDENT IN AN ORGANIZED HEALTH CARE EDUCATION/TRAINING PROGRAM

## 2023-12-14 PROCEDURE — 99999 PR PBB SHADOW E&M-EST. PATIENT-LVL III: CPT | Mod: PBBFAC,,, | Performed by: STUDENT IN AN ORGANIZED HEALTH CARE EDUCATION/TRAINING PROGRAM

## 2023-12-14 PROCEDURE — 99213 OFFICE O/P EST LOW 20 MIN: CPT | Mod: PBBFAC | Performed by: STUDENT IN AN ORGANIZED HEALTH CARE EDUCATION/TRAINING PROGRAM

## 2023-12-14 PROCEDURE — 3078F DIAST BP <80 MM HG: CPT | Mod: CPTII,,, | Performed by: STUDENT IN AN ORGANIZED HEALTH CARE EDUCATION/TRAINING PROGRAM

## 2023-12-14 PROCEDURE — 99213 OFFICE O/P EST LOW 20 MIN: CPT | Mod: S$PBB,,, | Performed by: STUDENT IN AN ORGANIZED HEALTH CARE EDUCATION/TRAINING PROGRAM

## 2023-12-31 NOTE — PROGRESS NOTES
[facility]  General [facility] Clinic Progress Note    HPI:  Caridad Hope is a 53 y.o. female with Stage III CRC s/p left hemicolectomy who presents for follow up.  Tolerating regular diet with daily bowel movements.  Struggling with depression after close proximity of cancer diag and treatment with father's death.  Seeking counseling.     ROS:  Negative except above    PE:  Vitals:    12/14/23 1338   BP: 108/67   BP Location: Right arm   Patient Position: Sitting   BP Method: Medium (Automatic)   Pulse: 78   SpO2: 99%   Weight: 59.4 kg (130 lb 15.3 oz)        Gen: Alert and oriented x3, judgement intact, NAD  CV: RRR  Resp: CTAB; breaths non-labored and symmetrical  Abd: Soft, NT, ND, BS+;  incisions well healed with no SOI  Extremities: No c/c/e    A/P:  53 y.o. female with Stage III CRC s/p left hemicolectomy who presents for follow up  -Due for CT CAP - will contact patient with results   -RTC 3 months for H&P and CEA  -Last CEA normal      -patient expressed desire to NOT pursue adjuvant chemotherapy  --Aggressive surveillance schedule discussed with patient which includes:  -H&P & CEA every 3-6 mos for 2 years, then every 6 mos for 5 years total. CT CAP every 6-12 months for 5 years      Yarelis Paez MD  General Surgery   [phone]        12/31/2023  6:21 PM

## 2024-03-28 ENCOUNTER — OFFICE VISIT (OUTPATIENT)
Dept: SURGERY | Facility: CLINIC | Age: 54
End: 2024-03-28
Payer: MEDICAID

## 2024-03-28 VITALS
HEIGHT: 63 IN | HEART RATE: 90 BPM | WEIGHT: 129.88 LBS | SYSTOLIC BLOOD PRESSURE: 118 MMHG | OXYGEN SATURATION: 100 % | BODY MASS INDEX: 23.01 KG/M2 | DIASTOLIC BLOOD PRESSURE: 56 MMHG

## 2024-03-28 DIAGNOSIS — C18.9 ADENOCARCINOMA OF COLON: Primary | ICD-10-CM

## 2024-03-28 PROCEDURE — 3008F BODY MASS INDEX DOCD: CPT | Mod: CPTII,,, | Performed by: STUDENT IN AN ORGANIZED HEALTH CARE EDUCATION/TRAINING PROGRAM

## 2024-03-28 PROCEDURE — 3074F SYST BP LT 130 MM HG: CPT | Mod: CPTII,,, | Performed by: STUDENT IN AN ORGANIZED HEALTH CARE EDUCATION/TRAINING PROGRAM

## 2024-03-28 PROCEDURE — 99213 OFFICE O/P EST LOW 20 MIN: CPT | Mod: PBBFAC | Performed by: STUDENT IN AN ORGANIZED HEALTH CARE EDUCATION/TRAINING PROGRAM

## 2024-03-28 PROCEDURE — 1159F MED LIST DOCD IN RCRD: CPT | Mod: CPTII,,, | Performed by: STUDENT IN AN ORGANIZED HEALTH CARE EDUCATION/TRAINING PROGRAM

## 2024-03-28 PROCEDURE — 99999 PR PBB SHADOW E&M-EST. PATIENT-LVL III: CPT | Mod: PBBFAC,,, | Performed by: STUDENT IN AN ORGANIZED HEALTH CARE EDUCATION/TRAINING PROGRAM

## 2024-03-28 PROCEDURE — 3078F DIAST BP <80 MM HG: CPT | Mod: CPTII,,, | Performed by: STUDENT IN AN ORGANIZED HEALTH CARE EDUCATION/TRAINING PROGRAM

## 2024-03-28 PROCEDURE — 99213 OFFICE O/P EST LOW 20 MIN: CPT | Mod: S$PBB,,, | Performed by: STUDENT IN AN ORGANIZED HEALTH CARE EDUCATION/TRAINING PROGRAM

## 2024-03-28 RX ORDER — ESTRADIOL 0.03 MG/D
1 PATCH TRANSDERMAL
COMMUNITY
Start: 2024-02-27

## 2024-03-28 RX ORDER — ESTRADIOL 0.5 MG/1
0.5 TABLET ORAL
COMMUNITY
Start: 2024-02-20 | End: 2024-04-01

## 2024-03-28 RX ORDER — MEDROXYPROGESTERONE ACETATE 2.5 MG/1
2.5 TABLET ORAL
COMMUNITY
Start: 2024-03-26

## 2024-03-28 RX ORDER — ONDANSETRON 4 MG/1
4 TABLET, FILM COATED ORAL EVERY 8 HOURS PRN
COMMUNITY
Start: 2024-02-21

## 2024-04-01 NOTE — PROGRESS NOTES
"[facility]  General Surgery Clinic Progress Note    HPI:  Caridad Hope is a 53 y.o. female with Stage III CRC s/p left hemicolectomy who presents for follow up.  Tolerating regular diet with daily bowel movements.  Voices no complaints.     ROS:  Negative except above    PE:  Vitals:    03/28/24 1142   BP: (!) 118/56   Pulse: 90   SpO2: 100%   Weight: 58.9 kg (129 lb 13.6 oz)   Height: 5' 3" (1.6 m)        Gen: Alert and oriented x3, judgement intact, NAD  CV: RRR  Resp: CTAB; breaths non-labored and symmetrical  Abd: Soft, NT, ND, BS+;  incisions well healed with no SOI  Extremities: No c/c/e    A/P:  53 y.o. female with Stage III CRC s/p left hemicolectomy who presents for follow up  -CT CAP not done - will reorder   -CEA      -patient expressed desire to NOT pursue adjuvant chemotherapy  --Aggressive surveillance schedule discussed with patient which includes:  -H&P & CEA every 3-6 mos for 2 years, then every 6 mos for 5 years total. CT CAP every 6-12 months for 5 years      Yarelis Paez MD  General Surgery   [phone]        3/31/2024  6:21 PM      "

## 2024-06-10 ENCOUNTER — HOSPITAL ENCOUNTER (OUTPATIENT)
Dept: RADIOLOGY | Facility: HOSPITAL | Age: 54
Discharge: HOME | End: 2024-06-10
Attending: STUDENT IN AN ORGANIZED HEALTH CARE EDUCATION/TRAINING PROGRAM
Payer: MEDICAID

## 2024-06-10 DIAGNOSIS — C18.9 ADENOCARCINOMA OF COLON: ICD-10-CM

## 2024-06-10 PROCEDURE — 71260 CT THORAX DX C+: CPT | Mod: TC

## 2024-06-10 PROCEDURE — 74177 CT ABD & PELVIS W/CONTRAST: CPT | Mod: TC

## 2024-06-10 PROCEDURE — 25500020 PHARM REV CODE 255: Performed by: STUDENT IN AN ORGANIZED HEALTH CARE EDUCATION/TRAINING PROGRAM

## 2024-06-10 RX ADMIN — IOHEXOL 100 ML: 350 INJECTION, SOLUTION INTRAVENOUS at 10:06

## 2024-07-17 ENCOUNTER — HOSPITAL ENCOUNTER (OUTPATIENT)
Dept: RADIOLOGY | Facility: HOSPITAL | Age: 54
Discharge: HOME | End: 2024-07-17
Attending: INTERNAL MEDICINE
Payer: MEDICAID

## 2024-07-17 VITALS — BODY MASS INDEX: 22.86 KG/M2 | WEIGHT: 129 LBS | HEIGHT: 63 IN

## 2024-07-17 DIAGNOSIS — Z12.31 ENCOUNTER FOR SCREENING MAMMOGRAM FOR BREAST CANCER: ICD-10-CM

## 2024-07-17 PROCEDURE — 77063 BREAST TOMOSYNTHESIS BI: CPT | Mod: TC

## 2024-08-06 ENCOUNTER — OFFICE VISIT (OUTPATIENT)
Dept: SURGERY | Facility: CLINIC | Age: 54
End: 2024-08-06
Payer: MEDICAID

## 2024-08-06 VITALS
HEIGHT: 63 IN | BODY MASS INDEX: 22.66 KG/M2 | WEIGHT: 127.88 LBS | SYSTOLIC BLOOD PRESSURE: 110 MMHG | DIASTOLIC BLOOD PRESSURE: 61 MMHG | OXYGEN SATURATION: 100 % | HEART RATE: 95 BPM

## 2024-08-06 DIAGNOSIS — Z85.038 HISTORY OF COLON CANCER: ICD-10-CM

## 2024-08-06 DIAGNOSIS — Z12.11 SCREEN FOR COLON CANCER: Primary | ICD-10-CM

## 2024-08-06 DIAGNOSIS — Z01.818 PRE-OP TESTING: ICD-10-CM

## 2024-08-06 PROCEDURE — 99999 PR PBB SHADOW E&M-EST. PATIENT-LVL V: CPT | Mod: PBBFAC,,, | Performed by: STUDENT IN AN ORGANIZED HEALTH CARE EDUCATION/TRAINING PROGRAM

## 2024-08-06 PROCEDURE — 3078F DIAST BP <80 MM HG: CPT | Mod: CPTII,,, | Performed by: STUDENT IN AN ORGANIZED HEALTH CARE EDUCATION/TRAINING PROGRAM

## 2024-08-06 PROCEDURE — 99215 OFFICE O/P EST HI 40 MIN: CPT | Mod: PBBFAC | Performed by: STUDENT IN AN ORGANIZED HEALTH CARE EDUCATION/TRAINING PROGRAM

## 2024-08-06 PROCEDURE — 1159F MED LIST DOCD IN RCRD: CPT | Mod: CPTII,,, | Performed by: STUDENT IN AN ORGANIZED HEALTH CARE EDUCATION/TRAINING PROGRAM

## 2024-08-06 PROCEDURE — 3008F BODY MASS INDEX DOCD: CPT | Mod: CPTII,,, | Performed by: STUDENT IN AN ORGANIZED HEALTH CARE EDUCATION/TRAINING PROGRAM

## 2024-08-06 PROCEDURE — 99214 OFFICE O/P EST MOD 30 MIN: CPT | Mod: S$PBB,,, | Performed by: STUDENT IN AN ORGANIZED HEALTH CARE EDUCATION/TRAINING PROGRAM

## 2024-08-06 PROCEDURE — 3074F SYST BP LT 130 MM HG: CPT | Mod: CPTII,,, | Performed by: STUDENT IN AN ORGANIZED HEALTH CARE EDUCATION/TRAINING PROGRAM

## 2024-08-11 RX ORDER — SODIUM CHLORIDE 9 MG/ML
INJECTION, SOLUTION INTRAVENOUS CONTINUOUS
OUTPATIENT
Start: 2024-08-11

## 2024-08-11 RX ORDER — SODIUM CHLORIDE 0.9 % (FLUSH) 0.9 %
10 SYRINGE (ML) INJECTION
OUTPATIENT
Start: 2024-08-11

## 2024-08-11 NOTE — PROGRESS NOTES
"[facility]  General Surgery Clinic Progress Note    HPI:  Caridad Hope is a 53 y.o. female with Stage III CRC s/p left hemicolectomy who presents for follow up.  Tolerating regular diet with daily bowel movements.  Voices no complaints. - unchanged    ROS:  Negative except above    PE:  Vitals:    08/06/24 1606   BP: 110/61   Pulse: 95   SpO2: 100%   Weight: 58 kg (127 lb 13.9 oz)   Height: 5' 3" (1.6 m)        Gen: Alert and oriented x3, judgement intact, NAD  CV: RRR  Resp: CTAB; breaths non-labored and symmetrical  Abd: Soft, NT, ND, BS+;  incisions well healed with no SOI  Extremities: No c/c/e    Imaging:   CT CAP:  Impression:     No evidence of recurrence or metastatic disease    A/P:  53 y.o. female with Stage III CRC s/p left hemicolectomy who presents for follow up  -CEA and surveillance CT CAP normal   -Due for 1 year colonoscopy   -To Endo 9/16 for colonoscopy   -Suprep  -Informed consent obtained       Yarelis Paez MD  General Surgery   [phone]        8/11/2024  6:21 PM        "

## 2024-09-09 ENCOUNTER — HOSPITAL ENCOUNTER (OUTPATIENT)
Dept: PREADMISSION TESTING | Facility: HOSPITAL | Age: 54
Discharge: HOME | End: 2024-09-09
Payer: MEDICAID

## 2024-10-01 ENCOUNTER — HOSPITAL ENCOUNTER (OUTPATIENT)
Dept: PULMONOLOGY | Facility: HOSPITAL | Age: 54
Discharge: HOME | End: 2024-10-01
Attending: STUDENT IN AN ORGANIZED HEALTH CARE EDUCATION/TRAINING PROGRAM
Payer: MEDICAID

## 2024-10-01 ENCOUNTER — HOSPITAL ENCOUNTER (OUTPATIENT)
Dept: PREADMISSION TESTING | Facility: HOSPITAL | Age: 54
Discharge: HOME | End: 2024-10-01
Attending: STUDENT IN AN ORGANIZED HEALTH CARE EDUCATION/TRAINING PROGRAM
Payer: MEDICAID

## 2024-10-01 VITALS — HEIGHT: 63 IN | BODY MASS INDEX: 22.68 KG/M2 | WEIGHT: 128 LBS

## 2024-10-01 DIAGNOSIS — Z85.038 HISTORY OF COLON CANCER: ICD-10-CM

## 2024-10-01 DIAGNOSIS — Z01.818 PRE-OP TESTING: ICD-10-CM

## 2024-10-01 DIAGNOSIS — Z12.11 SCREEN FOR COLON CANCER: ICD-10-CM

## 2024-10-01 LAB
OHS QRS DURATION: 80 MS
OHS QTC CALCULATION: 428 MS

## 2024-10-01 PROCEDURE — 93005 ELECTROCARDIOGRAM TRACING: CPT

## 2024-10-01 PROCEDURE — 93010 ELECTROCARDIOGRAM REPORT: CPT | Mod: ,,, | Performed by: INTERNAL MEDICINE

## 2024-10-01 RX ORDER — MULTIVITAMIN
1 TABLET ORAL DAILY
COMMUNITY

## 2024-11-08 ENCOUNTER — ANESTHESIA EVENT (OUTPATIENT)
Dept: ENDOSCOPY | Facility: HOSPITAL | Age: 54
End: 2024-11-08
Payer: MEDICAID

## 2024-11-08 RX ORDER — SODIUM, POTASSIUM,MAG SULFATES 17.5-3.13G
1 SOLUTION, RECONSTITUTED, ORAL ORAL DAILY
Qty: 1 KIT | Refills: 0 | Status: SHIPPED | OUTPATIENT
Start: 2024-11-08 | End: 2024-11-11

## 2024-11-08 NOTE — ANESTHESIA PREPROCEDURE EVALUATION
11/08/2024  Caridad Hope is a 54 y.o., female.      Pre-op Assessment    I have reviewed the Patient Summary Reports.    I have reviewed the NPO Status.   I have reviewed the Medications.     Review of Systems  Anesthesia Hx:  No problems with previous Anesthesia             Denies Family Hx of Anesthesia complications.    Denies Personal Hx of Anesthesia complications.                    Social:  Former Smoker       Hematology/Oncology:                        --  Cancer in past history:              surgery   Oncology Comments: colon     Cardiovascular:  Cardiovascular Normal                  ECG has been reviewed.                            Pulmonary:  Pulmonary Normal                       Renal/:  Renal/ Normal                 Hepatic/GI:  Hepatic/GI Normal                    Musculoskeletal:         Spine Disorders: cervical            Neurological:        Peripheral Neuropathy                          Endocrine:  Endocrine Normal              Lab Results   Component Value Date    WBC 6.04 10/01/2024    HGB 13.2 10/01/2024    HCT 38.3 10/01/2024    MCV 89 10/01/2024     10/01/2024      CMP  Sodium   Date Value Ref Range Status   10/01/2024 138 136 - 145 mmol/L Final     Potassium   Date Value Ref Range Status   10/01/2024 3.8 3.5 - 5.1 mmol/L Final     Chloride   Date Value Ref Range Status   10/01/2024 102 95 - 110 mmol/L Final     CO2   Date Value Ref Range Status   10/01/2024 29 23 - 29 mmol/L Final     Glucose   Date Value Ref Range Status   10/01/2024 90 70 - 110 mg/dL Final     BUN   Date Value Ref Range Status   10/01/2024 15 6 - 20 mg/dL Final     Creatinine   Date Value Ref Range Status   10/01/2024 0.9 0.5 - 1.4 mg/dL Final     Calcium   Date Value Ref Range Status   10/01/2024 9.1 8.7 - 10.5 mg/dL Final     Total Protein   Date Value Ref Range Status   10/01/2024 7.3  6.0 - 8.4 g/dL Final     Albumin   Date Value Ref Range Status   10/01/2024 3.8 3.5 - 5.2 g/dL Final     Total Bilirubin   Date Value Ref Range Status   10/01/2024 0.4 0.1 - 1.0 mg/dL Final     Comment:     For infants and newborns, interpretation of results should be based  on gestational age, weight and in agreement with clinical  observations.    Premature Infant recommended reference ranges:  Up to 24 hours.............<8.0 mg/dL  Up to 48 hours............<12.0 mg/dL  3-5 days..................<15.0 mg/dL  6-29 days.................<15.0 mg/dL    For patients on Eltrombopag therapy, use of Dimension [place] TBIL is   not   recommended.       Alkaline Phosphatase   Date Value Ref Range Status   10/01/2024 92 55 - 135 U/L Final     AST   Date Value Ref Range Status   10/01/2024 12 10 - 40 U/L Final     ALT   Date Value Ref Range Status   10/01/2024 18 10 - 44 U/L Final     Anion Gap   Date Value Ref Range Status   10/01/2024 7 3 - 11 mmol/L Final     eGFR   Date Value Ref Range Status   10/01/2024 >60.0 >60 mL/min/1.73 m^2 Final        Physical Exam  General: Well nourished    Airway:  Mallampati: II / I  Mouth Opening: Normal  TM Distance: Normal  Tongue: Normal  Neck ROM: Normal ROM    Dental:  Intact    Chest/Lungs:  Clear to auscultation    Heart:  Rate: Normal  Rhythm: Regular Rhythm  Sounds: Normal        Anesthesia Plan  Type of Anesthesia, risks & benefits discussed:    Anesthesia Type: MAC  Intra-op Monitoring Plan: Standard ASA Monitors  Post Op Pain Control Plan: multimodal analgesia  Induction:  IV  Airway Plan: Direct  Informed Consent: Informed consent signed with the Patient and all parties understand the risks and agree with anesthesia plan.  All questions answered.   ASA Score: 2  Day of Surgery Review of History & Physical: I have interviewed and examined the patient. I have reviewed the patient's H&P dated: There are no significant changes.     Ready For Surgery From Anesthesia Perspective.      .

## 2024-11-11 ENCOUNTER — HOSPITAL ENCOUNTER (OUTPATIENT)
Facility: HOSPITAL | Age: 54
Discharge: HOME | End: 2024-11-11
Attending: STUDENT IN AN ORGANIZED HEALTH CARE EDUCATION/TRAINING PROGRAM | Admitting: STUDENT IN AN ORGANIZED HEALTH CARE EDUCATION/TRAINING PROGRAM
Payer: MEDICAID

## 2024-11-11 ENCOUNTER — ANESTHESIA (OUTPATIENT)
Dept: ENDOSCOPY | Facility: HOSPITAL | Age: 54
End: 2024-11-11
Payer: MEDICAID

## 2024-11-11 VITALS
DIASTOLIC BLOOD PRESSURE: 58 MMHG | SYSTOLIC BLOOD PRESSURE: 158 MMHG | HEART RATE: 80 BPM | RESPIRATION RATE: 16 BRPM | TEMPERATURE: 98 F | OXYGEN SATURATION: 97 %

## 2024-11-11 DIAGNOSIS — Z85.038 HISTORY OF COLON CANCER: ICD-10-CM

## 2024-11-11 DIAGNOSIS — Z12.11 SCREEN FOR COLON CANCER: Primary | ICD-10-CM

## 2024-11-11 PROCEDURE — 45380 COLONOSCOPY AND BIOPSY: CPT | Mod: ,,, | Performed by: STUDENT IN AN ORGANIZED HEALTH CARE EDUCATION/TRAINING PROGRAM

## 2024-11-11 PROCEDURE — 63600175 PHARM REV CODE 636 W HCPCS: Performed by: NURSE ANESTHETIST, CERTIFIED REGISTERED

## 2024-11-11 PROCEDURE — 45380 COLONOSCOPY AND BIOPSY: CPT | Performed by: STUDENT IN AN ORGANIZED HEALTH CARE EDUCATION/TRAINING PROGRAM

## 2024-11-11 PROCEDURE — 27201423 OPTIME MED/SURG SUP & DEVICES STERILE SUPPLY: Performed by: STUDENT IN AN ORGANIZED HEALTH CARE EDUCATION/TRAINING PROGRAM

## 2024-11-11 PROCEDURE — 37000009 HC ANESTHESIA EA ADD 15 MINS: Performed by: STUDENT IN AN ORGANIZED HEALTH CARE EDUCATION/TRAINING PROGRAM

## 2024-11-11 PROCEDURE — 37000008 HC ANESTHESIA 1ST 15 MINUTES: Performed by: STUDENT IN AN ORGANIZED HEALTH CARE EDUCATION/TRAINING PROGRAM

## 2024-11-11 RX ORDER — SODIUM CHLORIDE 9 MG/ML
INJECTION, SOLUTION INTRAVENOUS CONTINUOUS
Status: DISCONTINUED | OUTPATIENT
Start: 2024-11-11 | End: 2024-11-11 | Stop reason: HOSPADM

## 2024-11-11 RX ORDER — PROPOFOL 10 MG/ML
INJECTION, EMULSION INTRAVENOUS
Status: DISCONTINUED | OUTPATIENT
Start: 2024-11-11 | End: 2024-11-11

## 2024-11-11 RX ORDER — SODIUM CHLORIDE 0.9 % (FLUSH) 0.9 %
10 SYRINGE (ML) INJECTION
Status: DISCONTINUED | OUTPATIENT
Start: 2024-11-11 | End: 2024-11-11 | Stop reason: HOSPADM

## 2024-11-11 RX ADMIN — PROPOFOL 50 MG: 10 INJECTION, EMULSION INTRAVENOUS at 07:11

## 2024-11-11 RX ADMIN — PROPOFOL 40 MG: 10 INJECTION, EMULSION INTRAVENOUS at 07:11

## 2024-11-11 RX ADMIN — PROPOFOL 70 MG: 10 INJECTION, EMULSION INTRAVENOUS at 07:11

## 2024-11-11 NOTE — PROGRESS NOTES
Patient seen and examined.  No interval change since the below obtained H&P  Informed consent verified    NPO since midnight  Prep completed - Suprep  No anticoagulation  All questions and concerns addressed  To Endo for screening colonoscopy     Yarelis Paez MD  General Surgery   [phone]      [facility] Progress Note    HPI:  Caridad Hope is a 54 y.o. female with Stage III CRC s/p left hemicolectomy who presents for follow up.  Tolerating regular diet with daily bowel movements.  Voices no complaints. - unchanged    ROS:  Negative except above    PE:  Vitals:    11/11/24 0615   BP: (!) 113/55   BP Location: Left arm   Patient Position: Sitting   Pulse: 75   Resp: 18   Temp: 97.8 °F (36.6 °C)   TempSrc: Oral   SpO2: 100%        Gen: Alert and oriented x3, judgement intact, NAD  CV: RRR  Resp: CTAB; breaths non-labored and symmetrical  Abd: Soft, NT, ND, BS+;  incisions well healed with no SOI  Extremities: No c/c/e    Imaging:   CT CAP:  Impression:     No evidence of recurrence or metastatic disease    A/P:  54 y.o. female with Stage III CRC s/p left hemicolectomy who presents for follow up  -CEA and surveillance CT CAP normal   -Due for 1 year colonoscopy   -To Endo 9/16 for colonoscopy   -Suprep  -Informed consent obtained       Yarelis Paez MD  General Surgery   [phone]        11/11/2024  6:21 PM

## 2024-11-11 NOTE — DISCHARGE INSTRUCTIONS
NO DRIVING OR ALCOHOL 24 HOURS AFTER PROCEDURE   FOLLOW UP WITH DR MOREIRA AS SCHEDULED   NOTIFY HER OF ANY PROBLEMS OR CONCERNS

## 2024-11-11 NOTE — TRANSFER OF CARE
Anesthesia Transfer of Care Note    Patient: Caridad Hope    Procedure(s) Performed: Procedure(s) (LRB):  COLONOSCOPY, WITH 1 OR MORE BIOPSIES (N/A)    Patient location: OPS    Anesthesia Type: MAC    Transport from OR: Transported from OR on room air with adequate spontaneous ventilation    Post pain: adequate analgesia    Post assessment: no apparent anesthetic complications    Post vital signs: stable    Level of consciousness: awake and alert    Nausea/Vomiting: no nausea/vomiting    Complications: none    Transfer of care protocol was followed      Last vitals:     BP 95/62  P 80  R 18  O2 Sat 100%

## 2024-11-11 NOTE — OP NOTE
[facility] - OR Peri Services  General Surgery Department  Operative Note    SUMMARY     Date of Procedure: 11/11/2024    Procedure: Procedure(s) (LRB):  COLONOSCOPY, WITH 1 OR MORE BIOPSIES: 45784 (CPT®)     Surgeon(s) and Role:  Yarelis Paez MD     Pre-Operative Diagnosis: Pre-Op Diagnosis Codes:      * Screen for colon cancer [Z12.11]    Post-Operative Diagnosis: Same         Anesthesia: Local MAC      Findings:  -Multilobulated, broad based, adenomatous polyp in the cecum s/p piece meal partial polypectomy   -recto-sigmoid anastomosis intact   -There was normal mucosa with normal submucosal venous pattern.    -No vascular lesions were identified.    -No major diverticular disease was encountered.   -Grade 1 internal hemorrhoids with external component    Indications for the Procedure:  55yo female with history of colorectal cancer s/p left hemicolectomy in 2022 who presents for --screening colonoscopy.    Operative Conduct in Detail:   The risks, benefits, and alternatives were thoroughly discussed with the patient. Despite the risks, the patient wished to proceed. Informed consent was obtained and it will scanned to the electronic chart.      The patient was placed on left lateral decubitus. After achieving moderate sedation, a digital rectal examination was performed; subsequently, a standard colonoscope was introduced through the anus and advanced without difficulty up to the cecum where terminal ileum and appendiceal orifice were visualized The scope was withdrawn slowly while the mucosa was carefully examined. The following findings were seen:    Bowel Preparation: good  Rectal exam was normal with good rectal tone and no masses or blood detected in the rectal vault.   -Multilobulated, broad based, adenomatous polyp in the cecum s/p piece meal partial polypectomy   -recto-sigmoid anastomosis intact   -There was normal mucosa with normal submucosal venous pattern.    -No vascular lesions were  identified.    -No major diverticular disease was encountered.   -Grade 1 internal hemorrhoids with external component    Withdrawal time >6 minutes (if no biopsies/polypectomies obtained)      Complications: No    Estimated Blood Loss (EBL): Minimal             Specimens:   Specimens (From admission, onward)       Start     Ordered    11/11/24 0756  Specimen to Pathology, Surgery Gastrointestinal tract  Once        Comments: Pre-op Diagnosis: Screen for colon cancer [Z12.11]Procedure(s):COLONOSCOPY, WITH 1 OR MORE BIOPSIES Number of specimens: 1Name of specimens: biopsies of cecal polyp at 0744     References:    Click here for ordering Quick Tip   Question Answer Comment   Procedure Type: Gastrointestinal tract    Specimen Class: Complex case/Special    Release to patient Immediate        11/11/24 0756                           Condition: Good    Disposition: PACU - hemodynamically stable.    Recommendation:    Repeat colonoscopy in 6 months     Yarelis Paez MD  General Surgery   [phone]

## 2024-11-11 NOTE — DISCHARGE SUMMARY
[facility] - Endoscopy  Discharge Note  Short Stay    Procedure(s) (LRB):  COLONOSCOPY, WITH 1 OR MORE BIOPSIES (N/A)      OUTCOME: Patient tolerated treatment/procedure well without complication and is now ready for discharge.    DISPOSITION: Home or Self Care    FINAL DIAGNOSIS:  Screen for colon cancer    FOLLOWUP: In clinic    DISCHARGE INSTRUCTIONS:    Discharge Procedure Orders   Diet Adult Regular     No driving until:   Order Comments: 24 hours after procedure     Notify your health care provider if you experience any of the following:  temperature >100.4     Notify your health care provider if you experience any of the following:  persistent nausea and vomiting or diarrhea     Notify your health care provider if you experience any of the following:  severe uncontrolled pain     Activity as tolerated        TIME SPENT ON DISCHARGE: 5 minutes

## 2024-11-13 NOTE — ANESTHESIA POSTPROCEDURE EVALUATION
Anesthesia Post Evaluation    Patient: Caridad Hope    Procedure(s) Performed: Procedure(s) (LRB):  COLONOSCOPY, WITH 1 OR MORE BIOPSIES (N/A)    Final Anesthesia Type: MAC      Patient location during evaluation: OPS  Patient participation: Yes- Able to Participate  Level of consciousness: oriented and awake and alert  Post-procedure vital signs: reviewed and stable  Pain management: adequate  Airway patency: patent    PONV status at discharge: No PONV  Anesthetic complications: no      Cardiovascular status: blood pressure returned to baseline  Respiratory status: spontaneous ventilation, unassisted and room air  Hydration status: euvolemic  Follow-up not needed.              Vitals Value Taken Time   /58 11/11/24 0829   Temp 36.6 °C (97.8 °F) 11/11/24 0802   Pulse 80 11/11/24 0802   Resp 16 11/11/24 0802   SpO2 97 % 11/11/24 0802         No case tracking events are documented in the log.      Pain/Anthony Score: No data recorded

## 2024-11-15 LAB — SPECIMEN TO PATHOLOGY - SURGICAL: NORMAL

## 2025-04-03 DIAGNOSIS — N95.9 UNSPECIFIED MENOPAUSAL AND PERIMENOPAUSAL DISORDER: Primary | ICD-10-CM

## 2025-04-03 DIAGNOSIS — Z12.31 ENCOUNTER FOR SCREENING MAMMOGRAM FOR BREAST CANCER: ICD-10-CM

## 2025-04-07 ENCOUNTER — HOSPITAL ENCOUNTER (OUTPATIENT)
Dept: RADIOLOGY | Facility: HOSPITAL | Age: 55
Discharge: HOME | End: 2025-04-07
Attending: OBSTETRICS & GYNECOLOGY
Payer: MEDICAID

## 2025-04-07 DIAGNOSIS — N95.9 UNSPECIFIED MENOPAUSAL AND PERIMENOPAUSAL DISORDER: ICD-10-CM

## 2025-04-07 PROCEDURE — 77091 TBS TECHL CALCULATION ONLY: CPT

## 2025-04-07 PROCEDURE — 77080 DXA BONE DENSITY AXIAL: CPT | Mod: 26,,, | Performed by: RADIOLOGY

## 2025-04-07 PROCEDURE — 77092 TBS I&R FX RSK QHP: CPT | Mod: ,,, | Performed by: RADIOLOGY

## 2025-04-08 ENCOUNTER — OFFICE VISIT (OUTPATIENT)
Dept: SURGERY | Facility: CLINIC | Age: 55
End: 2025-04-08
Payer: MEDICAID

## 2025-04-08 VITALS
BODY MASS INDEX: 23.36 KG/M2 | HEIGHT: 63 IN | OXYGEN SATURATION: 100 % | WEIGHT: 131.81 LBS | SYSTOLIC BLOOD PRESSURE: 118 MMHG | DIASTOLIC BLOOD PRESSURE: 56 MMHG | RESPIRATION RATE: 18 BRPM | HEART RATE: 92 BPM

## 2025-04-08 DIAGNOSIS — K63.5 CECAL POLYP: Primary | ICD-10-CM

## 2025-04-08 DIAGNOSIS — Z85.038 HISTORY OF COLON CANCER: ICD-10-CM

## 2025-04-08 PROCEDURE — 99214 OFFICE O/P EST MOD 30 MIN: CPT | Mod: PBBFAC | Performed by: STUDENT IN AN ORGANIZED HEALTH CARE EDUCATION/TRAINING PROGRAM

## 2025-04-08 PROCEDURE — 99213 OFFICE O/P EST LOW 20 MIN: CPT | Mod: S$PBB,,, | Performed by: STUDENT IN AN ORGANIZED HEALTH CARE EDUCATION/TRAINING PROGRAM

## 2025-04-08 PROCEDURE — 3074F SYST BP LT 130 MM HG: CPT | Mod: CPTII,,, | Performed by: STUDENT IN AN ORGANIZED HEALTH CARE EDUCATION/TRAINING PROGRAM

## 2025-04-08 PROCEDURE — 3008F BODY MASS INDEX DOCD: CPT | Mod: CPTII,,, | Performed by: STUDENT IN AN ORGANIZED HEALTH CARE EDUCATION/TRAINING PROGRAM

## 2025-04-08 PROCEDURE — 3078F DIAST BP <80 MM HG: CPT | Mod: CPTII,,, | Performed by: STUDENT IN AN ORGANIZED HEALTH CARE EDUCATION/TRAINING PROGRAM

## 2025-04-08 PROCEDURE — 99999 PR PBB SHADOW E&M-EST. PATIENT-LVL IV: CPT | Mod: PBBFAC,,, | Performed by: STUDENT IN AN ORGANIZED HEALTH CARE EDUCATION/TRAINING PROGRAM

## 2025-04-08 RX ORDER — SODIUM CHLORIDE 0.9 % (FLUSH) 0.9 %
10 SYRINGE (ML) INJECTION
Status: CANCELLED | OUTPATIENT
Start: 2025-04-08

## 2025-04-08 RX ORDER — SODIUM CHLORIDE 9 MG/ML
INJECTION, SOLUTION INTRAVENOUS CONTINUOUS
Status: CANCELLED | OUTPATIENT
Start: 2025-04-08

## 2025-04-10 RX ORDER — SODIUM, POTASSIUM,MAG SULFATES 17.5-3.13G
1 SOLUTION, RECONSTITUTED, ORAL ORAL 2 TIMES DAILY
Qty: 1 KIT | Refills: 0 | Status: SHIPPED | OUTPATIENT
Start: 2025-04-10 | End: 2025-04-11

## 2025-04-16 ENCOUNTER — HOSPITAL ENCOUNTER (OUTPATIENT)
Dept: PULMONOLOGY | Facility: HOSPITAL | Age: 55
Discharge: HOME | End: 2025-04-16
Attending: STUDENT IN AN ORGANIZED HEALTH CARE EDUCATION/TRAINING PROGRAM
Payer: MEDICAID

## 2025-04-16 ENCOUNTER — LAB VISIT (OUTPATIENT)
Dept: LAB | Facility: HOSPITAL | Age: 55
End: 2025-04-16
Attending: STUDENT IN AN ORGANIZED HEALTH CARE EDUCATION/TRAINING PROGRAM
Payer: MEDICAID

## 2025-04-16 ENCOUNTER — HOSPITAL ENCOUNTER (OUTPATIENT)
Dept: PREADMISSION TESTING | Facility: HOSPITAL | Age: 55
Discharge: HOME | End: 2025-04-16
Attending: STUDENT IN AN ORGANIZED HEALTH CARE EDUCATION/TRAINING PROGRAM
Payer: MEDICAID

## 2025-04-16 VITALS — WEIGHT: 128 LBS | HEIGHT: 63 IN | BODY MASS INDEX: 22.68 KG/M2

## 2025-04-16 DIAGNOSIS — Z01.818 PRE-OP TESTING: ICD-10-CM

## 2025-04-16 DIAGNOSIS — Z12.11 SCREEN FOR COLON CANCER: Primary | ICD-10-CM

## 2025-04-16 DIAGNOSIS — C18.9 ADENOCARCINOMA OF COLON: ICD-10-CM

## 2025-04-16 LAB
ABSOLUTE EOSINOPHIL (OHS): 0.19 K/UL
ABSOLUTE MONOCYTE (OHS): 0.54 K/UL (ref 0.3–1)
ABSOLUTE NEUTROPHIL COUNT (OHS): 2.66 K/UL (ref 1.8–7.7)
ALBUMIN SERPL BCP-MCNC: 4.1 G/DL (ref 3.5–5.2)
ALBUMIN SERPL BCP-MCNC: 4.2 G/DL (ref 3.5–5.2)
ALP SERPL-CCNC: 83 UNIT/L (ref 40–150)
ALT SERPL W/O P-5'-P-CCNC: 17 UNIT/L (ref 10–44)
ANION GAP (OHS): 11 MMOL/L (ref 8–16)
ANION GAP (OHS): 9 MMOL/L (ref 8–16)
AST SERPL-CCNC: 31 UNIT/L (ref 11–45)
BASOPHILS # BLD AUTO: 0.04 K/UL
BASOPHILS NFR BLD AUTO: 0.7 %
BILIRUB SERPL-MCNC: 0.4 MG/DL (ref 0.1–1)
BUN SERPL-MCNC: 15 MG/DL (ref 6–20)
BUN SERPL-MCNC: 15 MG/DL (ref 6–20)
CALCIUM SERPL-MCNC: 9.2 MG/DL (ref 8.7–10.5)
CALCIUM SERPL-MCNC: 9.6 MG/DL (ref 8.7–10.5)
CHLORIDE SERPL-SCNC: 102 MMOL/L (ref 95–110)
CHLORIDE SERPL-SCNC: 103 MMOL/L (ref 95–110)
CO2 SERPL-SCNC: 26 MMOL/L (ref 23–29)
CO2 SERPL-SCNC: 27 MMOL/L (ref 23–29)
CREAT SERPL-MCNC: 0.8 MG/DL (ref 0.5–1.4)
CREAT SERPL-MCNC: 0.8 MG/DL (ref 0.5–1.4)
ERYTHROCYTE [DISTWIDTH] IN BLOOD BY AUTOMATED COUNT: 12 % (ref 11.5–14.5)
GFR SERPLBLD CREATININE-BSD FMLA CKD-EPI: >60 ML/MIN/1.73/M2
GFR SERPLBLD CREATININE-BSD FMLA CKD-EPI: >60 ML/MIN/1.73/M2
GLUCOSE SERPL-MCNC: 68 MG/DL (ref 70–110)
GLUCOSE SERPL-MCNC: 92 MG/DL (ref 70–110)
HCT VFR BLD AUTO: 37.7 % (ref 37–48.5)
HGB BLD-MCNC: 12.5 GM/DL (ref 12–16)
IMM GRANULOCYTES # BLD AUTO: 0.01 K/UL (ref 0–0.04)
IMM GRANULOCYTES NFR BLD AUTO: 0.2 % (ref 0–0.5)
LYMPHOCYTES # BLD AUTO: 2.27 K/UL (ref 1–4.8)
MCH RBC QN AUTO: 29.9 PG (ref 27–31)
MCHC RBC AUTO-ENTMCNC: 33.2 G/DL (ref 32–36)
MCV RBC AUTO: 90 FL (ref 82–98)
NUCLEATED RBC (/100WBC) (OHS): 0 /100 WBC
OHS QRS DURATION: 84 MS
OHS QTC CALCULATION: 459 MS
PHOSPHATE SERPL-MCNC: 3.1 MG/DL (ref 2.7–4.5)
PLATELET # BLD AUTO: 284 K/UL (ref 150–450)
PMV BLD AUTO: 9.2 FL (ref 9.2–12.9)
POTASSIUM SERPL-SCNC: 3.5 MMOL/L (ref 3.5–5.1)
POTASSIUM SERPL-SCNC: 3.5 MMOL/L (ref 3.5–5.1)
PROT SERPL-MCNC: 7 GM/DL (ref 6–8.4)
RBC # BLD AUTO: 4.18 M/UL (ref 4–5.4)
RELATIVE EOSINOPHIL (OHS): 3.3 %
RELATIVE LYMPHOCYTE (OHS): 39.8 % (ref 18–48)
RELATIVE MONOCYTE (OHS): 9.5 % (ref 4–15)
RELATIVE NEUTROPHIL (OHS): 46.5 % (ref 38–73)
SODIUM SERPL-SCNC: 138 MMOL/L (ref 136–145)
SODIUM SERPL-SCNC: 140 MMOL/L (ref 136–145)
WBC # BLD AUTO: 5.71 K/UL (ref 3.9–12.7)

## 2025-04-16 PROCEDURE — 36415 COLL VENOUS BLD VENIPUNCTURE: CPT

## 2025-04-16 PROCEDURE — 84100 ASSAY OF PHOSPHORUS: CPT

## 2025-04-16 PROCEDURE — 82040 ASSAY OF SERUM ALBUMIN: CPT

## 2025-04-16 PROCEDURE — 93010 ELECTROCARDIOGRAM REPORT: CPT | Mod: ,,, | Performed by: INTERNAL MEDICINE

## 2025-04-16 PROCEDURE — 85025 COMPLETE CBC W/AUTO DIFF WBC: CPT

## 2025-04-16 PROCEDURE — 93005 ELECTROCARDIOGRAM TRACING: CPT

## 2025-04-16 NOTE — DISCHARGE INSTRUCTIONS
BEFORE THE PROCEDURE:    REPORT ANY CHANGE IN YOUR PHYSICAL CONDITION TO YOUR DOCTOR IMMEDIATELY.  SELF ISOLATE AND CHECK TEMPERATURE DAILY, IF TEMP OVER 100, CALL PHYSICIAN IMMEDIATELY.    TRY TO REFRAIN FROM SMOKING AND ALCOHOL 72 HOURS BEFORE YOUR PROCEDURE.     THE DAY BEFORE YOUR PROCEDURE YOU WILL BE ON A  LIQUID DIET FROM WAKING IN THE MORNING UNTIL MIDNIGHT. YOU MAY HAVE WATER ONLY 4 HOURS PRIOR TO ARRIVAL.     REFER TO YOUR PHYSICIANS INSTRUCTIONS ON LIQUID DIET AND COLON PREP.    SOMEONE WILL CALL YOU THE DAY BEFORE YOUR PROCEDURE WITH A CHECK-IN TIME FOR YOUR PROCEDURE.    CHECK IN AT FIRST FLOOR REGISTRATION DESK.     DAY OF YOUR PROCEDURE:    NO MAKE UP, NAIL POLISH OR JEWELRY.  TAKE BLOOD PRESSURE MEDICATIONS THE MORNING OF YOUR PROCEDURE, WITH SMALL SIPS WATER, AS DIRECTED BY YOUR PHYSICIAN.   DO NOT TAKE ANY DIABETIC MEDICATIONS UNLESS DIRECTED TO DO SO BY YOUR PHYSICIAN.   CONTACT LENSES AND DENTURES MUST BE REMOVED.  A RESPONSIBLE ADULT MUST ACCOMPANY YOU HOME UPON DISCHARGE.   ONLY 1 VISITOR ALLOWED PER ROOM.     YOUR THOUGHTS AND OPINIONS HELP US TO BETTER SERVE YOU.     PLEASE PARTICIPATE IN SURVEYS ABOUT YOUR CARE.    THANK YOU FOR CHOOSING [facility].

## 2025-04-22 ENCOUNTER — ANESTHESIA EVENT (OUTPATIENT)
Dept: ENDOSCOPY | Facility: HOSPITAL | Age: 55
End: 2025-04-22
Payer: MEDICAID

## 2025-04-22 NOTE — H&P (VIEW-ONLY)
[facility] Progress Note          HPI:    Caridad Hope is a 53 y.o. female with Stage III CRC s/p left hemicolectomy without adjuvant therapy who presents for follow up.  Colonoscopy in 11/2024 demonstrated moderate sized cecal polyp s/p piece meal polypectomy.  Presents today to schedule repeat colonoscopy.     PMH:   Past Medical History:   Diagnosis Date    Adenocarcinoma of colon 05/19/2023    Blood in stool 01/2023    Blood in urine     Colonic mass 04/2023    Hair loss     Herpes     History of chest pain     Mass of colon     Wears contact lenses     LEFT     PSH:   Past Surgical History:   Procedure Laterality Date    COLONOSCOPY N/A 04/26/2023    Procedure: COLONOSCOPY WITH ENDOSCOPIC TATTOOING;  Surgeon: Yarelis Paez MD;  Location: [facility] OR;  Service: General;  Laterality: N/A;    COLONOSCOPY, WITH 1 OR MORE BIOPSIES N/A 03/29/2023    Procedure: COLONOSCOPY, WITH 1 OR MORE BIOPSIES WITH ENDOSCOPIC TATTOO;  Surgeon: Yarelis Paez MD;  Location: [facility] ENDO;  Service: General;  Laterality: N/A;  2nd 0600    COLONOSCOPY, WITH 1 OR MORE BIOPSIES N/A 11/11/2024    Procedure: COLONOSCOPY, WITH 1 OR MORE BIOPSIES;  Surgeon: Yarelis Paez MD;  Location: [facility] ENDO;  Service: General;  Laterality: N/A;  1st 0600    FLEXIBLE SIGMOIDOSCOPY N/A 01/25/2023    Procedure: Flex Sig;  Surgeon: Yarelis Paez MD;  Location: [facility] ENDO;  Service: General;  Laterality: N/A;  #2 0600    LAPAROSCOPIC HEMICOLECTOMY Left 04/26/2023    Procedure: HEMICOLECTOMY, LAPAROSCOPIC, LEFT CONVERTED TO OPEN;  Surgeon: Yarelis Paez MD;  Location: [facility] OR;  Service: General;  Laterality: Left;  2nd - 0600  wants to do colonoscopy 1st    TUBAL LIGATION       Meds: See medication list;  No anticoagulation  ALL: Avelox [moxifloxacin], Dilaudid [hydromorphone], Loniten [minoxidil], and Quinolones  FHX: non contributory   SOC: Social History[1]  ROS: Review of Systems   Constitutional:   "Negative for chills, fever, malaise/fatigue and weight loss.   Respiratory:  Negative for cough.    Cardiovascular:  Negative for chest pain.   Gastrointestinal:  Negative for abdominal pain, blood in stool, constipation, diarrhea, heartburn, melena, nausea and vomiting.   All other systems reviewed and are negative.      Physical Exam:  BP (!) 118/56   Pulse 92   Resp 18   Ht 5' 3" (1.6 m)   Wt 59.8 kg (131 lb 13.4 oz)   LMP  (LMP Unknown) Comment: thinks january  SpO2 100%   BMI 23.35 kg/m²   Physical Exam  Constitutional:       General: She is not in acute distress.     Appearance: She is obese. She is not ill-appearing or toxic-appearing.   Cardiovascular:      Rate and Rhythm: Normal rate and regular rhythm.   Pulmonary:      Effort: Pulmonary effort is normal. No respiratory distress.   Abdominal:      General: There is no distension.      Palpations: Abdomen is soft.      Tenderness: There is no abdominal tenderness. There is no guarding or rebound.   Musculoskeletal:      Right lower leg: No edema.      Left lower leg: No edema.   Skin:     General: Skin is warm and dry.      Capillary Refill: Capillary refill takes less than 2 seconds.   Neurological:      Mental Status: She is alert. Mental status is at baseline.       Wt Readings from Last 2 Encounters:   04/16/25 58.1 kg (128 lb)   04/08/25 59.8 kg (131 lb 13.4 oz)               A/P:  54 y.o. female with cecal polyp s/p piece meal polypectomy who presents for follow up  -To [facility] 4/23 for colonoscopy   -Informed consent obtained   -[place]  -Pre-op       Yarelis Paez MD  [phone]           [1]   Social History  Socioeconomic History    Marital status:    Tobacco Use    Smoking status: Former     Types: Cigarettes     Passive exposure: Never    Tobacco comments:     Over 30 years ago quit smoking, drinking, and drugs   Substance and Sexual Activity    Alcohol use: Not Currently    Drug use: Not Currently     "

## 2025-04-22 NOTE — ANESTHESIA PREPROCEDURE EVALUATION
04/22/2025  Caridad Hope is a 54 y.o., female.      Pre-op Assessment    I have reviewed the Patient Summary Reports.    I have reviewed the NPO Status.   I have reviewed the Medications.     Review of Systems  Anesthesia Hx:  No problems with previous Anesthesia             Denies Family Hx of Anesthesia complications.    Denies Personal Hx of Anesthesia complications.                    Social:  Former Smoker       Cardiovascular:  Cardiovascular Normal                  ECG has been reviewed.                            Pulmonary:  Pulmonary Normal                       Renal/:  Renal/ Normal                 Hepatic/GI:  Hepatic/GI Normal       Hx colon ca   s/p left hemicolecyomy             Neurological:  Neurology Normal                                      Endocrine:  Endocrine Normal              Lab Results   Component Value Date    WBC 5.71 04/16/2025    HGB 12.5 04/16/2025    HCT 37.7 04/16/2025    MCV 90 04/16/2025     04/16/2025      CMP  Sodium   Date Value Ref Range Status   04/16/2025 140 136 - 145 mmol/L Final   10/01/2024 138 136 - 145 mmol/L Final     Potassium   Date Value Ref Range Status   04/16/2025 3.5 3.5 - 5.1 mmol/L Final   10/01/2024 3.8 3.5 - 5.1 mmol/L Final     Chloride   Date Value Ref Range Status   04/16/2025 102 95 - 110 mmol/L Final   10/01/2024 102 95 - 110 mmol/L Final     CO2   Date Value Ref Range Status   04/16/2025 27 23 - 29 mmol/L Final   10/01/2024 29 23 - 29 mmol/L Final     Glucose   Date Value Ref Range Status   10/01/2024 90 70 - 110 mg/dL Final     BUN   Date Value Ref Range Status   04/16/2025 15 6 - 20 mg/dL Final     Creatinine   Date Value Ref Range Status   04/16/2025 0.8 0.5 - 1.4 mg/dL Final     Calcium   Date Value Ref Range Status   04/16/2025 9.6 8.7 - 10.5 mg/dL Final   10/01/2024 9.1 8.7 - 10.5 mg/dL Final     Total Protein    Date Value Ref Range Status   10/01/2024 7.3 6.0 - 8.4 g/dL Final     Albumin   Date Value Ref Range Status   04/16/2025 4.2 3.5 - 5.2 g/dL Final   10/01/2024 3.8 3.5 - 5.2 g/dL Final     Total Bilirubin   Date Value Ref Range Status   10/01/2024 0.4 0.1 - 1.0 mg/dL Final     Comment:     For infants and newborns, interpretation of results should be based  on gestational age, weight and in agreement with clinical  observations.    Premature Infant recommended reference ranges:  Up to 24 hours.............<8.0 mg/dL  Up to 48 hours............<12.0 mg/dL  3-5 days..................<15.0 mg/dL  6-29 days.................<15.0 mg/dL    For patients on Eltrombopag therapy, use of Dimension [place] TBIL is   not   recommended.       Bilirubin Total   Date Value Ref Range Status   04/16/2025 0.4 0.1 - 1.0 mg/dL Final     Comment:     For infants and newborns, interpretation of results should be based   on gestational age, weight and in agreement with clinical   observations.    Premature Infant recommended reference ranges:   0-24 hours:  <8.0 mg/dL   24-48 hours: <12.0 mg/dL   3-5 days:    <15.0 mg/dL   6-29 days:   <15.0 mg/dL     Alkaline Phosphatase   Date Value Ref Range Status   10/01/2024 92 55 - 135 U/L Final     ALP   Date Value Ref Range Status   04/16/2025 83 40 - 150 unit/L Final     AST   Date Value Ref Range Status   04/16/2025 31 11 - 45 unit/L Final   10/01/2024 12 10 - 40 U/L Final     ALT   Date Value Ref Range Status   04/16/2025 17 10 - 44 unit/L Final   10/01/2024 18 10 - 44 U/L Final     Anion Gap   Date Value Ref Range Status   04/16/2025 11 8 - 16 mmol/L Final     eGFR   Date Value Ref Range Status   04/16/2025 >60 >60 mL/min/1.73/m2 Final     Comment:     Estimated GFR calculated using the CKD-EPI creatinine (2021) equation.   10/01/2024 >60.0 >60 mL/min/1.73 m^2 Final        Physical Exam  General: Well nourished    Airway:  Mallampati: II / I  Mouth Opening: Normal  TM Distance: Normal  Tongue:  Normal  Neck ROM: Normal ROM    Dental:  Intact    Chest/Lungs:  Clear to auscultation    Heart:  Rate: Normal  Rhythm: Regular Rhythm  Sounds: Normal        Anesthesia Plan  Type of Anesthesia, risks & benefits discussed:    Anesthesia Type: MAC  Intra-op Monitoring Plan: Standard ASA Monitors  Post Op Pain Control Plan: multimodal analgesia  Induction:  IV  Airway Plan: Direct  Informed Consent: Informed consent signed with the Patient and all parties understand the risks and agree with anesthesia plan.  All questions answered.   ASA Score: 2  Day of Surgery Review of History & Physical: I have interviewed and examined the patient. I have reviewed the patient's H&P dated: There are no significant changes.     Ready For Surgery From Anesthesia Perspective.     .

## 2025-04-22 NOTE — PROGRESS NOTES
[facility] Progress Note          HPI:    Caridad Hope is a 53 y.o. female with Stage III CRC s/p left hemicolectomy without adjuvant therapy who presents for follow up.  Colonoscopy in 11/2024 demonstrated moderate sized cecal polyp s/p piece meal polypectomy.  Presents today to schedule repeat colonoscopy.     PMH:   Past Medical History:   Diagnosis Date    Adenocarcinoma of colon 05/19/2023    Blood in stool 01/2023    Blood in urine     Colonic mass 04/2023    Hair loss     Herpes     History of chest pain     Mass of colon     Wears contact lenses     LEFT     PSH:   Past Surgical History:   Procedure Laterality Date    COLONOSCOPY N/A 04/26/2023    Procedure: COLONOSCOPY WITH ENDOSCOPIC TATTOOING;  Surgeon: Yraelis Paez MD;  Location: [facility] OR;  Service: General;  Laterality: N/A;    COLONOSCOPY, WITH 1 OR MORE BIOPSIES N/A 03/29/2023    Procedure: COLONOSCOPY, WITH 1 OR MORE BIOPSIES WITH ENDOSCOPIC TATTOO;  Surgeon: Yarelis Paez MD;  Location: [facility] ENDO;  Service: General;  Laterality: N/A;  2nd 0600    COLONOSCOPY, WITH 1 OR MORE BIOPSIES N/A 11/11/2024    Procedure: COLONOSCOPY, WITH 1 OR MORE BIOPSIES;  Surgeon: Yarelis Paez MD;  Location: [facility] ENDO;  Service: General;  Laterality: N/A;  1st 0600    FLEXIBLE SIGMOIDOSCOPY N/A 01/25/2023    Procedure: Flex Sig;  Surgeon: Yarelis Paez MD;  Location: [facility] ENDO;  Service: General;  Laterality: N/A;  #2 0600    LAPAROSCOPIC HEMICOLECTOMY Left 04/26/2023    Procedure: HEMICOLECTOMY, LAPAROSCOPIC, LEFT CONVERTED TO OPEN;  Surgeon: Yarelis Paez MD;  Location: [facility] OR;  Service: General;  Laterality: Left;  2nd - 0600  wants to do colonoscopy 1st    TUBAL LIGATION       Meds: See medication list;  No anticoagulation  ALL: Avelox [moxifloxacin], Dilaudid [hydromorphone], Loniten [minoxidil], and Quinolones  FHX: non contributory   SOC: Social History[1]  ROS: Review of Systems   Constitutional:   "Negative for chills, fever, malaise/fatigue and weight loss.   Respiratory:  Negative for cough.    Cardiovascular:  Negative for chest pain.   Gastrointestinal:  Negative for abdominal pain, blood in stool, constipation, diarrhea, heartburn, melena, nausea and vomiting.   All other systems reviewed and are negative.      Physical Exam:  BP (!) 118/56   Pulse 92   Resp 18   Ht 5' 3" (1.6 m)   Wt 59.8 kg (131 lb 13.4 oz)   LMP  (LMP Unknown) Comment: thinks january  SpO2 100%   BMI 23.35 kg/m²   Physical Exam  Constitutional:       General: She is not in acute distress.     Appearance: She is obese. She is not ill-appearing or toxic-appearing.   Cardiovascular:      Rate and Rhythm: Normal rate and regular rhythm.   Pulmonary:      Effort: Pulmonary effort is normal. No respiratory distress.   Abdominal:      General: There is no distension.      Palpations: Abdomen is soft.      Tenderness: There is no abdominal tenderness. There is no guarding or rebound.   Musculoskeletal:      Right lower leg: No edema.      Left lower leg: No edema.   Skin:     General: Skin is warm and dry.      Capillary Refill: Capillary refill takes less than 2 seconds.   Neurological:      Mental Status: She is alert. Mental status is at baseline.       Wt Readings from Last 2 Encounters:   04/16/25 58.1 kg (128 lb)   04/08/25 59.8 kg (131 lb 13.4 oz)               A/P:  54 y.o. female with cecal polyp s/p piece meal polypectomy who presents for follow up  -To [facility] 4/23 for colonoscopy   -Informed consent obtained   -[facility]  -Pre-op       Yarelis Paez MD  [phone]           [1]   Social History  Socioeconomic History    Marital status:    Tobacco Use    Smoking status: Former     Types: Cigarettes     Passive exposure: Never    Tobacco comments:     Over 30 years ago quit smoking, drinking, and drugs   Substance and Sexual Activity    Alcohol use: Not Currently    Drug use: Not Currently     "

## 2025-04-23 ENCOUNTER — HOSPITAL ENCOUNTER (OUTPATIENT)
Facility: HOSPITAL | Age: 55
Discharge: HOME | End: 2025-04-23
Attending: STUDENT IN AN ORGANIZED HEALTH CARE EDUCATION/TRAINING PROGRAM | Admitting: STUDENT IN AN ORGANIZED HEALTH CARE EDUCATION/TRAINING PROGRAM
Payer: MEDICAID

## 2025-04-23 ENCOUNTER — ANESTHESIA (OUTPATIENT)
Dept: ENDOSCOPY | Facility: HOSPITAL | Age: 55
End: 2025-04-23
Payer: MEDICAID

## 2025-04-23 VITALS
DIASTOLIC BLOOD PRESSURE: 56 MMHG | OXYGEN SATURATION: 99 % | TEMPERATURE: 98 F | HEART RATE: 75 BPM | SYSTOLIC BLOOD PRESSURE: 92 MMHG | RESPIRATION RATE: 20 BRPM

## 2025-04-23 DIAGNOSIS — K63.5 CECAL POLYP: Primary | ICD-10-CM

## 2025-04-23 DIAGNOSIS — Z85.038 HISTORY OF COLON CANCER: ICD-10-CM

## 2025-04-23 PROCEDURE — 37000009 HC ANESTHESIA EA ADD 15 MINS: Performed by: STUDENT IN AN ORGANIZED HEALTH CARE EDUCATION/TRAINING PROGRAM

## 2025-04-23 PROCEDURE — 37000008 HC ANESTHESIA 1ST 15 MINUTES: Performed by: STUDENT IN AN ORGANIZED HEALTH CARE EDUCATION/TRAINING PROGRAM

## 2025-04-23 PROCEDURE — 25000003 PHARM REV CODE 250: Performed by: STUDENT IN AN ORGANIZED HEALTH CARE EDUCATION/TRAINING PROGRAM

## 2025-04-23 PROCEDURE — 63600175 PHARM REV CODE 636 W HCPCS: Performed by: NURSE ANESTHETIST, CERTIFIED REGISTERED

## 2025-04-23 PROCEDURE — 45385 COLONOSCOPY W/LESION REMOVAL: CPT | Performed by: STUDENT IN AN ORGANIZED HEALTH CARE EDUCATION/TRAINING PROGRAM

## 2025-04-23 PROCEDURE — 27201423 OPTIME MED/SURG SUP & DEVICES STERILE SUPPLY: Performed by: STUDENT IN AN ORGANIZED HEALTH CARE EDUCATION/TRAINING PROGRAM

## 2025-04-23 RX ORDER — SODIUM CHLORIDE 9 MG/ML
INJECTION, SOLUTION INTRAVENOUS CONTINUOUS
Status: DISCONTINUED | OUTPATIENT
Start: 2025-04-23 | End: 2025-04-23 | Stop reason: HOSPADM

## 2025-04-23 RX ORDER — PROPOFOL 10 MG/ML
INJECTION, EMULSION INTRAVENOUS
Status: DISCONTINUED | OUTPATIENT
Start: 2025-04-23 | End: 2025-04-23

## 2025-04-23 RX ORDER — SODIUM CHLORIDE 9 MG/ML
INJECTION, SOLUTION INTRAVENOUS CONTINUOUS
Status: DISCONTINUED | OUTPATIENT
Start: 2025-04-23 | End: 2025-04-23

## 2025-04-23 RX ORDER — SODIUM CHLORIDE 0.9 % (FLUSH) 0.9 %
10 SYRINGE (ML) INJECTION
Status: DISCONTINUED | OUTPATIENT
Start: 2025-04-23 | End: 2025-04-23 | Stop reason: HOSPADM

## 2025-04-23 RX ADMIN — PROPOFOL 60 MG: 10 INJECTION, EMULSION INTRAVENOUS at 01:04

## 2025-04-23 RX ADMIN — SODIUM CHLORIDE: 9 INJECTION, SOLUTION INTRAVENOUS at 12:04

## 2025-04-23 RX ADMIN — PROPOFOL 50 MG: 10 INJECTION, EMULSION INTRAVENOUS at 02:04

## 2025-04-23 RX ADMIN — PROPOFOL 40 MG: 10 INJECTION, EMULSION INTRAVENOUS at 02:04

## 2025-04-23 RX ADMIN — PROPOFOL 60 MG: 10 INJECTION, EMULSION INTRAVENOUS at 02:04

## 2025-04-23 NOTE — INTERVAL H&P NOTE
Patient seen and examined.  No interval change since the below obtained H&P  Informed consent verified    NPO since midnight  Prep completed - Suprep   No anticoagulation  All questions and concerns addressed  To Endo for screening colonoscopy     Yarelis Paez MD  General Surgery   [phone]

## 2025-04-23 NOTE — OP NOTE
[facility] - OR Periop Services  General Surgery Department  Operative Note    SUMMARY     Date of Procedure: 4/23/2025    Procedure: Procedure(s) (LRB):  COLONOSCOPY, WITH POLYPECTOMY USING SNARE: 72832 (CPT®)       Surgeon(s) and Role:  Yarelis Paez MD     Pre-Operative Diagnosis: Pre-Op Diagnosis Codes:      * Cecal polyp [K63.5]     * History of colon cancer [Z85.038]    Post-Operative Diagnosis: Same         Anesthesia: Local MAC      Findings:  -Multilobulated cecal polyp s/p hot snare polypectomy   -There was normal mucosa with normal submucosal venous pattern.    -No vascular lesions were identified.    -No major diverticular disease was encountered.   -Intact colorectal anastomosis     Indications for the Procedure:  53yo female with history of colorectal cancer and large cecal polyp who presents for diagnostic colonoscopy.     Operative Conduct in Detail:   The risks, benefits, and alternatives were thoroughly discussed with the patient. Despite the risks, the patient wished to proceed. Informed consent was obtained and it will scanned to the electronic chart.      The patient was placed on left lateral decubitus. After achieving moderate sedation, a digital rectal examination was performed; subsequently, a standard colonoscope was introduced through the anus and advanced without difficulty up to the cecum where terminal ileum and appendiceal orifice were visualized The scope was withdrawn slowly while the mucosa was carefully examined. The following findings were seen:    Bowel Preparation: good  Rectal exam was normal with good rectal tone and no masses or blood detected in the rectal vault.   -Multilobulated cecal polyp s/p hot snare polypectomy   -There was normal mucosa with normal submucosal venous pattern.    -No vascular lesions were identified.    -No major diverticular disease was encountered.   -Intact colorectal anastomosis     Withdrawal time >6 minutes (if no biopsies/polypectomies  obtained)      Complications: No    Estimated Blood Loss (EBL): Minimal             Specimens:   Specimens (From admission, onward)       Start     Ordered    04/23/25 1416  Specimen to Pathology Gastrointestinal tract  RELEASE UPON ORDERING        References:    Click here for ordering Quick Tip   Question:  Release to patient  Answer:  Immediate    04/23/25 1416                           Condition: Good    Disposition: PACU - hemodynamically stable.    Recommendation:    Repeat colonoscopy in 6 months for polyp surveillance     Yarelis Paez MD  General Surgery   [phone]

## 2025-04-23 NOTE — TRANSFER OF CARE
Anesthesia Transfer of Care Note    Patient: Caridad Hope    Procedure(s) Performed: Procedure(s) (LRB):  COLONOSCOPY, WITH POLYPECTOMY USING SNARE (N/A)    Patient location: OPS    Anesthesia Type: MAC    Transport from OR: Transported from OR on room air with adequate spontaneous ventilation    Post pain: adequate analgesia    Post assessment: no apparent anesthetic complications    Post vital signs: stable    Level of consciousness: awake, alert and oriented    Nausea/Vomiting: no nausea/vomiting    Complications: none    Transfer of care protocol was followed      Last vitals:     BP 90/52  P 90  R 14  O2 Sat 99%

## 2025-04-25 NOTE — ANESTHESIA POSTPROCEDURE EVALUATION
Anesthesia Post Evaluation    Patient: Caridad Hope    Procedure(s) Performed: Procedure(s) (LRB):  COLONOSCOPY, WITH POLYPECTOMY USING SNARE (N/A)    Final Anesthesia Type: MAC      Patient location during evaluation: OPS  Patient participation: Yes- Able to Participate  Level of consciousness: awake and alert and oriented  Post-procedure vital signs: reviewed and stable  Pain management: adequate  Airway patency: patent    PONV status at discharge: No PONV  Anesthetic complications: no      Cardiovascular status: blood pressure returned to baseline  Respiratory status: spontaneous ventilation, room air and unassisted  Hydration status: euvolemic  Follow-up not needed.              Vitals Value Taken Time   BP 92/56 04/23/25 14:39   Temp 14 04/25/25 08:01   Pulse 75 04/23/25 14:39   Resp 20 04/23/25 14:39   SpO2 99 % 04/23/25 14:39         No case tracking events are documented in the log.      Pain/Anthony Score: No data recorded

## 2025-04-28 LAB — VIEW PATHOLOGY REPORT (RELIAPATH): NORMAL

## 2025-04-29 ENCOUNTER — RESULTS FOLLOW-UP (OUTPATIENT)
Dept: SURGERY | Facility: CLINIC | Age: 55
End: 2025-04-29
Payer: MEDICAID

## (undated) DEVICE — ELECTRODE MEDI-TRACE 855 FOAM

## (undated) DEVICE — GOWN NONREINF SET-IN SLV XL

## (undated) DEVICE — KIT VIA CUSTOM PROCEDURE

## (undated) DEVICE — SOL IRRI STRL WATER 1000ML

## (undated) DEVICE — SYR SLIP TIP 60 CC DISP

## (undated) DEVICE — CANNULA SSOFT C02 MALE 14FT

## (undated) DEVICE — GOWN SURGICAL BRTHBL XL

## (undated) DEVICE — SPONGE LAP 18X18 PREWASHED

## (undated) DEVICE — MARKER ENDOSCOPIC SPOT EX

## (undated) DEVICE — DRESSING PRIMAPORE IV 2X3IN

## (undated) DEVICE — CONNECTOR WATERJET ENDO

## (undated) DEVICE — GLOVE BIOGEL ECLIPSE SZ 7.5

## (undated) DEVICE — SKIN MARKER STER DUAL TIP

## (undated) DEVICE — TRAP ETRAP POLYP 50 TRAY

## (undated) DEVICE — CLIPPER BLADE MOD 4406 (CAREF)

## (undated) DEVICE — SNARE SNAREMASTER OVAL 25MM

## (undated) DEVICE — POSITIONER HEAD SUPINE PINK

## (undated) DEVICE — KIT GELPORT LAPAROSCOPIC ABD

## (undated) DEVICE — KIT BIOGUARD AIR WTR SUC VALVE

## (undated) DEVICE — SUT ETHIBOND 0 CR/MO-7 8-18

## (undated) DEVICE — DRAPE CORETEMP FLD WRM 56X62IN

## (undated) DEVICE — UNDERGLOVES BIOGEL PI SIZE 7.5

## (undated) DEVICE — UNDERPAD DELUXE FLUFF 30X30IN

## (undated) DEVICE — STRAP KNEE & BODY DISP 4X34IN

## (undated) DEVICE — ELECTRODE FOAM 535 TEARDROP

## (undated) DEVICE — TOWEL OR XRAY WHITE 17X26IN

## (undated) DEVICE — APPLIER LIGACLIP MED 11IN

## (undated) DEVICE — SUT CTD VICRYL VIL BR SH 27

## (undated) DEVICE — TRAY MAJOR LAPAROTOMY SURG I

## (undated) DEVICE — LINER SUCTION CANNISTER REGUGA

## (undated) DEVICE — LINER GLOVE POWDERFREE SZ 6.5

## (undated) DEVICE — DRESSING PRIMAPORE 4X3 1/8IN

## (undated) DEVICE — CANNULA SUPERSOFT CO2 M AD 7FT

## (undated) DEVICE — SUT MONOCYRL 4-0 PS2 UND

## (undated) DEVICE — TROCAR ENDOPATH XCEL 5X100MM

## (undated) DEVICE — LINER MEDI-VAC PPV FLTR 3000CC

## (undated) DEVICE — FORCEP ALLIGATOR 2.8MM W/NDL

## (undated) DEVICE — UNDERPAD DISPOSABLE 30X30IN

## (undated) DEVICE — SOL NACL IRR 1000ML BTL

## (undated) DEVICE — DRAPE ABD MAJ PCH 122X78X102IN

## (undated) DEVICE — TUBING IRRIGATION W/ AIR

## (undated) DEVICE — SUT PROLENE 2-0 30 SH

## (undated) DEVICE — SUT SILK 0 SH 30IN BLK BR

## (undated) DEVICE — SPONGE DRY VIA GREEN

## (undated) DEVICE — SUT 1 36IN PDS II VIO MONO

## (undated) DEVICE — TUBE SUC UNIVERSAL .25XIN 6FT

## (undated) DEVICE — SUT 0 60IN PDS II VIO MONO

## (undated) DEVICE — SYS SEE SHARP SCOPE ANTIFOG

## (undated) DEVICE — SHEARS HARMONIC 5CM 36CM

## (undated) DEVICE — ELECTRODE REM PLYHSV RETURN 9

## (undated) DEVICE — RELOAD PROXIMATE GREEN 75MM

## (undated) DEVICE — GOWN SURGICAL BRTHBL XL XLNG

## (undated) DEVICE — SUT SILK SH 2-0 30IN MP BLK

## (undated) DEVICE — TROCAR ENDOPATH XCEL 11MM 10CM

## (undated) DEVICE — COVER OVERHEAD SURG LT BLUE

## (undated) DEVICE — NDL INJECTORFORCE SH 5MM 23G

## (undated) DEVICE — TIP YANKAUERS BULB NO VENT

## (undated) DEVICE — GLOVE BIOGEL ECLIPSE SZ 6.5

## (undated) DEVICE — UNDERGLOVES BIOGEL PI SZ 7 LF

## (undated) DEVICE — KIT PINK PAD EXT BODY STRP SHT

## (undated) DEVICE — SUT SILK 0 SUTUPAK SA86H

## (undated) DEVICE — TROCAR ENDOPATH XCEL 12X100MM

## (undated) DEVICE — CONNECTOR TORRENT SCP OLYMPUS

## (undated) DEVICE — SCALPEL #11 BLADE STRL DISP

## (undated) DEVICE — TUBING OXYGEN CONNECT BUBBLE

## (undated) DEVICE — TRAY CATH FOL SIL URIMTR 16FR

## (undated) DEVICE — SET PNEUMOCLEAR HEAT HUM SE HF

## (undated) DEVICE — KIT COLLECTION MANIFOLD V2

## (undated) DEVICE — GLOVE BIOGEL ECLIPSE SZ 7

## (undated) DEVICE — STAPLER SKIN PROXIMATE WIDE

## (undated) DEVICE — DRAPE INCISE IOBAN 2 23X23IN

## (undated) DEVICE — SUT 2-0 VICRYL / SH (J417)

## (undated) DEVICE — CUTTER PROXIMATE GREEN 75MM

## (undated) DEVICE — Device

## (undated) DEVICE — LINER GLOVE POWDERFREE SZ 7

## (undated) DEVICE — SEE MEDLINE ITEM 157194

## (undated) DEVICE — BLANKET UPPER BODY 78.7X29.9IN

## (undated) DEVICE — GLOVE SURGICAL LATEX SZ 6.5

## (undated) DEVICE — APPLICATOR CHLORAPREP ORN 26ML